# Patient Record
Sex: FEMALE | Race: WHITE | Employment: FULL TIME | URBAN - METROPOLITAN AREA
[De-identification: names, ages, dates, MRNs, and addresses within clinical notes are randomized per-mention and may not be internally consistent; named-entity substitution may affect disease eponyms.]

---

## 2017-01-02 ENCOUNTER — HOSPITAL ENCOUNTER (EMERGENCY)
Age: 42
Discharge: HOME | End: 2017-01-02
Attending: EMERGENCY MEDICINE
Payer: COMMERCIAL

## 2017-01-02 VITALS
HEART RATE: 100 BPM | BODY MASS INDEX: 33.66 KG/M2 | DIASTOLIC BLOOD PRESSURE: 75 MMHG | OXYGEN SATURATION: 100 % | TEMPERATURE: 97.7 F | WEIGHT: 190 LBS | SYSTOLIC BLOOD PRESSURE: 152 MMHG | RESPIRATION RATE: 16 BRPM | HEIGHT: 63 IN

## 2017-01-02 DIAGNOSIS — N30.01 ACUTE CYSTITIS WITH HEMATURIA: Primary | ICD-10-CM

## 2017-01-02 LAB
APPEARANCE UR: ABNORMAL
BACTERIA URNS QL MICRO: ABNORMAL /HPF
BILIRUB UR QL: NEGATIVE
COLOR UR: YELLOW
EPITH CASTS URNS QL MICRO: ABNORMAL /LPF (ref 0–5)
GLUCOSE UR STRIP.AUTO-MCNC: NEGATIVE MG/DL
HCG UR QL: NEGATIVE
HGB UR QL STRIP: ABNORMAL
KETONES UR QL STRIP.AUTO: NEGATIVE MG/DL
LEUKOCYTE ESTERASE UR QL STRIP.AUTO: ABNORMAL
NITRITE UR QL STRIP.AUTO: NEGATIVE
PH UR STRIP: 5 [PH] (ref 5–8)
PROT UR STRIP-MCNC: ABNORMAL MG/DL
RBC #/AREA URNS HPF: ABNORMAL /HPF (ref 0–5)
SP GR UR REFRACTOMETRY: 1.02 (ref 1–1.03)
UROBILINOGEN UR QL STRIP.AUTO: 0.2 EU/DL (ref 0.2–1)
WBC URNS QL MICRO: ABNORMAL /HPF (ref 0–5)

## 2017-01-02 PROCEDURE — 74011250636 HC RX REV CODE- 250/636: Performed by: PHYSICIAN ASSISTANT

## 2017-01-02 PROCEDURE — 81001 URINALYSIS AUTO W/SCOPE: CPT | Performed by: PHYSICIAN ASSISTANT

## 2017-01-02 PROCEDURE — 74011250637 HC RX REV CODE- 250/637: Performed by: PHYSICIAN ASSISTANT

## 2017-01-02 PROCEDURE — 87086 URINE CULTURE/COLONY COUNT: CPT | Performed by: PHYSICIAN ASSISTANT

## 2017-01-02 PROCEDURE — 99284 EMERGENCY DEPT VISIT MOD MDM: CPT

## 2017-01-02 PROCEDURE — 74011000250 HC RX REV CODE- 250: Performed by: PHYSICIAN ASSISTANT

## 2017-01-02 PROCEDURE — 81025 URINE PREGNANCY TEST: CPT | Performed by: EMERGENCY MEDICINE

## 2017-01-02 PROCEDURE — 87186 SC STD MICRODIL/AGAR DIL: CPT | Performed by: PHYSICIAN ASSISTANT

## 2017-01-02 PROCEDURE — 87077 CULTURE AEROBIC IDENTIFY: CPT | Performed by: PHYSICIAN ASSISTANT

## 2017-01-02 PROCEDURE — 96372 THER/PROPH/DIAG INJ SC/IM: CPT

## 2017-01-02 RX ORDER — PHENAZOPYRIDINE HYDROCHLORIDE 100 MG/1
200 TABLET, FILM COATED ORAL ONCE
Status: COMPLETED | OUTPATIENT
Start: 2017-01-02 | End: 2017-01-02

## 2017-01-02 RX ORDER — PHENAZOPYRIDINE HYDROCHLORIDE 200 MG/1
200 TABLET, FILM COATED ORAL 3 TIMES DAILY
Qty: 6 TAB | Refills: 0 | Status: SHIPPED | OUTPATIENT
Start: 2017-01-02 | End: 2017-01-04

## 2017-01-02 RX ORDER — CEPHALEXIN 500 MG/1
500 CAPSULE ORAL 3 TIMES DAILY
Qty: 21 CAP | Refills: 0 | Status: SHIPPED | OUTPATIENT
Start: 2017-01-02 | End: 2017-01-09

## 2017-01-02 RX ORDER — CEPHALEXIN 250 MG/1
500 CAPSULE ORAL
Status: COMPLETED | OUTPATIENT
Start: 2017-01-02 | End: 2017-01-02

## 2017-01-02 RX ADMIN — PHENAZOPYRIDINE HYDROCHLORIDE 200 MG: 100 TABLET ORAL at 19:46

## 2017-01-02 RX ADMIN — CEPHALEXIN 500 MG: 250 CAPSULE ORAL at 19:46

## 2017-01-02 RX ADMIN — LIDOCAINE HYDROCHLORIDE 1 G: 10 INJECTION, SOLUTION INFILTRATION; PERINEURAL at 19:47

## 2017-01-03 NOTE — ED NOTES
amb into ed - reports onset urinary burning/frequency onset yesterday - pt w/ hx uti's - last one treated here 1 month ago. Denies any fever / chlls - pt reports sexual activity normally exacerbates her uti's.

## 2017-01-03 NOTE — ED PROVIDER NOTES
HPI Comments:   7:23 PM  35 y.o. female presents to ED c/o dysuria onset yesterday. Associated symptoms include urinary frequency, urinary urgency, hematuria, and slight abdominal pain. Reports she had similar sxs last month and this dysuria feels similar. Those sxs resolved but returned one day ago. LNMP was 2 weeks ago. Pt denies fever, chills, vaginal discharge, vaginal bleeding, and any other Sx or complaints. Patient is a 35 y.o. female presenting with urinary pain. The history is provided by the patient. No  was used. Urinary Pain    This is a new problem. The current episode started yesterday. The problem has not changed since onset. The pain is at a severity of 6/10. There has been no fever. Associated symptoms include frequency, hematuria, urgency, abdominal pain (mild) and back pain. Pertinent negatives include no chills, no nausea, no vomiting, no flank pain and no vaginal discharge. Past Medical History:   Diagnosis Date    Bipolar affective ([facility] [address]     Chronic obstructive pulmonary disease (HCC)     Depression     High cholesterol     Hypothyroidism     IBS (irritable bowel syndrome)     Migraine     Other ill-defined conditions(799.89)      frequent UTI's    UTI (urinary tract infection)        Past Surgical History:   Procedure Laterality Date    Hx heent       tonsillectomy    Hx cholecystectomy      Hx gyn       tubal ligation         History reviewed. No pertinent family history. Social History     Social History    Marital status:      Spouse name: N/A    Number of children: N/A    Years of education: N/A     Occupational History    Not on file.      Social History Main Topics    Smoking status: Current Every Day Smoker     Packs/day: 0.50    Smokeless tobacco: Not on file    Alcohol use Yes      Comment: rarely    Drug use: No    Sexual activity: Yes     Birth control/ protection: Surgical     Other Topics Concern    Not on file Social History Narrative         ALLERGIES: Sulfa (sulfonamide antibiotics) and Imitrex [sumatriptan succinate]    Review of Systems   Constitutional: Negative for chills and fever. HENT: Negative for sore throat. Cardiovascular: Negative for chest pain. Gastrointestinal: Positive for abdominal pain (mild). Negative for nausea and vomiting. Genitourinary: Positive for dysuria, frequency, hematuria, pelvic pain and urgency. Negative for difficulty urinating, flank pain, vaginal bleeding and vaginal discharge. Musculoskeletal: Positive for back pain. Negative for myalgias. Skin: Negative for rash and wound. Allergic/Immunologic: Negative for immunocompromised state. Neurological: Negative for dizziness and light-headedness. Hematological: Negative for adenopathy. Vitals:    01/02/17 1901   BP: 152/75   Pulse: 100   Resp: 16   Temp: 97.7 °F (36.5 °C)   SpO2: 100%   Weight: 86.2 kg (190 lb)   Height: 5' 3\" (1.6 m)            Physical Exam   Constitutional: She is oriented to person, place, and time. She appears well-developed and well-nourished. No distress.  female in NAD. Alert. Appears comfortable. In hallway bed J   HENT:   Head: Normocephalic and atraumatic. Right Ear: External ear normal.   Eyes: Conjunctivae are normal.   Neck: Normal range of motion. Cardiovascular: Normal rate, regular rhythm, normal heart sounds and intact distal pulses. Exam reveals no gallop and no friction rub. No murmur heard. Pulmonary/Chest: Effort normal and breath sounds normal. No accessory muscle usage. No tachypnea. No respiratory distress. She has no decreased breath sounds. She has no wheezes. She has no rhonchi. She has no rales. Abdominal: Soft. Bowel sounds are normal. She exhibits no distension, no ascites and no mass. There is no tenderness. There is no rigidity, no guarding, no CVA tenderness and no tenderness at McBurney's point. Musculoskeletal: Normal range of motion. Neurological: She is alert and oriented to person, place, and time. Skin: Skin is warm and dry. No rash noted. She is not diaphoretic. No erythema. Psychiatric: She has a normal mood and affect. Judgment normal.   Nursing note and vitals reviewed. RESULTS:    No orders to display        Labs Reviewed   URINALYSIS W/ RFLX MICROSCOPIC - Abnormal; Notable for the following:        Result Value    Protein TRACE (*)     Blood LARGE (*)     Leukocyte Esterase MODERATE (*)     All other components within normal limits   URINE MICROSCOPIC ONLY - Abnormal; Notable for the following:     Bacteria 2+ (*)     All other components within normal limits   CULTURE, URINE   HCG URINE, QL       Recent Results (from the past 12 hour(s))   URINALYSIS W/ RFLX MICROSCOPIC    Collection Time: 01/02/17  7:00 PM   Result Value Ref Range    Color YELLOW      Appearance CLOUDY      Specific gravity 1.020 1.005 - 1.030      pH (UA) 5.0 5.0 - 8.0      Protein TRACE (A) NEG mg/dL    Glucose NEGATIVE  NEG mg/dL    Ketone NEGATIVE  NEG mg/dL    Bilirubin NEGATIVE  NEG      Blood LARGE (A) NEG      Urobilinogen 0.2 0.2 - 1.0 EU/dL    Nitrites NEGATIVE  NEG      Leukocyte Esterase MODERATE (A) NEG     HCG URINE, QL    Collection Time: 01/02/17  7:00 PM   Result Value Ref Range    HCG urine, Ql. NEGATIVE  NEG     URINE MICROSCOPIC ONLY    Collection Time: 01/02/17  7:00 PM   Result Value Ref Range    WBC 15 to 20 0 - 5 /hpf    RBC 10 to 15 0 - 5 /hpf    Epithelial cells FEW 0 - 5 /lpf    Bacteria 2+ (A) NEG /hpf       MDM  Number of Diagnoses or Management Options  Acute cystitis with hematuria:   Diagnosis management comments: UTI/pyelo, stone, stricture, vaginitis, STI/PID, pregnancy.  Doubt appy, torsion, or TOA    ED Course     Medications   cefTRIAXone (ROCEPHIN) 1 g in lidocaine (XYLOCAINE) 10 mg/mL (1 %) IM syringe (not administered)   cephALEXin (KEFLEX) capsule 500 mg (not administered)   phenazopyridine (PYRIDIUM) tablet 200 mg (not administered)        Procedures    PROGRESS NOTE:  7:23 PM  Initial assessment performed. Written by Nini Covington ED Scribe, as dictated by Jorge Leahy PA-C     UA c/w UTI. No evidence of upper urinary tract involvement. Benign abdomen/pelvis. Reasons to RTED discussed with pt. All questions answered. Pt feels comfortable going home at this time. Pt expressed understanding and she agrees with plan. DISCHARGE NOTE:  7:29 PM  Melinda Krause's  results have been reviewed with her. She has been counseled regarding her diagnosis, treatment, and plan. She verbally conveys understanding and agreement of the signs, symptoms, diagnosis, treatment and prognosis and additionally agrees to follow up as discussed. She also agrees with the care-plan and conveys that all of her questions have been answered. I have also provided discharge instructions for her that include: educational information regarding their diagnosis and treatment, and list of reasons why they would want to return to the ED prior to their follow-up appointment, should her condition change. Proper ED utilization discussed with the pt. CLINICAL IMPRESSION:    1. Acute cystitis with hematuria        PLAN: DISCHARGE HOME    Follow-up Information     Follow up With Details Comments [id number] Kamini Pimentel MD Call in 2 days For follow up with PCP. [address] [place] [address]      [facility] EMERGENCY DEPT Go to As needed, If symptoms worsen. [address]  [phone]          Current Discharge Medication List      START taking these medications    Details   cephALEXin (KEFLEX) 500 mg capsule Take 1 Cap by mouth three (3) times daily for 7 days. Indications: E. COLI URINARY TRACT INFECTION  Qty: 21 Cap, Refills: 0      phenazopyridine (PYRIDIUM) 200 mg tablet Take 1 Tab by mouth three (3) times daily for 2 days.  Indications: DYSURIA  Qty: 6 Tab, Refills: 0 CONTINUE these medications which have NOT CHANGED    Details   BUTALB/ACETAMINOPHEN/CAFFEINE (FIORICET PO) Take  by mouth.      polyethylene glycol (MIRALAX) 17 gram/dose powder Take as directed. Qty: 119 g, Refills: 3      HYDROcodone-acetaminophen (NORCO) 5-325 mg per tablet Take 1 Tab by mouth every four (4) hours as needed for Pain. Qty: 20 Tab, Refills: 0      fluticasone (FLONASE) 50 mcg/actuation nasal spray 1 [place] by Both Nostrils route daily. 1 spray in each nostril. Qty: 1 Bottle, Refills: 0      sertraline (ZOLOFT) 100 mg tablet Take  by mouth daily. levothyroxine (SYNTHROID) 150 mcg tablet Take  by mouth Daily (before breakfast). esomeprazole (NEXIUM) 40 mg capsule Take  by mouth daily. ziprasidone (GEODON) 40 mg capsule Take 60 mg by mouth daily. zolpidem (AMBIEN) 10 mg tablet Take  by mouth nightly as needed. ALPRAZolam (XANAX) 1 mg tablet Take  by mouth. ATTESTATIONS:  This note is prepared by Letha Solano, acting as Scribe for Faizan Mcneill PA-C. Faizan Mcneill PA-C: The scribe's documentation has been prepared under my direction and personally reviewed by me in its entirety. I confirm that the note above accurately reflects all work, treatment, procedures, and medical decision making performed by me.

## 2017-01-03 NOTE — ED NOTES
Patient armband removed and shredded  I have reviewed discharge instructions with the patient. The patient verbalized understanding.

## 2017-01-03 NOTE — DISCHARGE INSTRUCTIONS
Urinary Tract Infection in Women: Care Instructions  Your Care Instructions    A urinary tract infection, or UTI, is a general term for an infection anywhere between the kidneys and the urethra (where urine comes out). Most UTIs are bladder infections. They often cause pain or burning when you urinate. UTIs are caused by bacteria and can be cured with antibiotics. Be sure to complete your treatment so that the infection goes away. Follow-up care is a key part of your treatment and safety. Be sure to make and go to all appointments, and call your doctor if you are having problems. It's also a good idea to know your test results and keep a list of the medicines you take. How can you care for yourself at home? · Take your antibiotics as directed. Do not stop taking them just because you feel better. You need to take the full course of antibiotics. · Drink extra water and other fluids for the next day or two. This may help wash out the bacteria that are causing the infection. (If you have kidney, heart, or liver disease and have to limit fluids, talk with your doctor before you increase your fluid intake.)  · Avoid drinks that are carbonated or have caffeine. They can irritate the bladder. · Urinate often. Try to empty your bladder each time. · To relieve pain, take a hot bath or lay a heating pad set on low over your lower belly or genital area. Never go to sleep with a heating pad in place. To prevent UTIs  · Drink plenty of water each day. This helps you urinate often, which clears bacteria from your system. (If you have kidney, heart, or liver disease and have to limit fluids, talk with your doctor before you increase your fluid intake.)  · Consider adding cranberry juice to your diet. · Urinate when you need to. · Urinate right after you have sex. · Change sanitary pads often. · Avoid douches, bubble baths, feminine hygiene sprays, and other feminine hygiene products that have deodorants.   · After going to the bathroom, wipe from front to back. When should you call for help? Call your doctor now or seek immediate medical care if:  · Symptoms such as fever, chills, nausea, or vomiting get worse or appear for the first time. · You have new pain in your back just below your rib cage. This is called flank pain. · There is new blood or pus in your urine. · You have any problems with your antibiotic medicine. Watch closely for changes in your health, and be sure to contact your doctor if:  · You are not getting better after taking an antibiotic for 2 days. · Your symptoms go away but then come back. Where can you learn more? Go to http://aury-cruz.info/. Enter I099 in the search box to learn more about \"Urinary Tract Infection in Women: Care Instructions. \"  Current as of: August 12, 2016  Content Version: 11.1  © 1801-4455 [employer]. Care instructions adapted under license by [employer] (which disclaims liability or warranty for this information). If you have questions about a medical condition or this instruction, always ask your healthcare professional. [address] any warranty or liability for your use of this information.

## 2017-01-04 NOTE — CALL BACK NOTE
9:16 AM  01/04/2017    Reviewed recent [facility] ED visit. Dx with UTI. On keflex. Will await sensitivity.      Mishel Raines PA-C

## 2017-01-05 LAB
BACTERIA SPEC CULT: NORMAL
SERVICE CMNT-IMP: NORMAL

## 2017-01-05 NOTE — CALL BACK NOTE
8:16 AM  01/05/2017    See previous call back note. Sensitive to keflex. On appropriate tx.      Patrick Croft PA-C

## 2017-02-01 ENCOUNTER — HOSPITAL ENCOUNTER (EMERGENCY)
Age: 42
Discharge: HOME | End: 2017-02-01
Attending: EMERGENCY MEDICINE
Payer: COMMERCIAL

## 2017-02-01 VITALS
SYSTOLIC BLOOD PRESSURE: 124 MMHG | DIASTOLIC BLOOD PRESSURE: 58 MMHG | BODY MASS INDEX: 33.66 KG/M2 | HEART RATE: 78 BPM | HEIGHT: 63 IN | WEIGHT: 190 LBS | OXYGEN SATURATION: 100 % | TEMPERATURE: 97.9 F | RESPIRATION RATE: 18 BRPM

## 2017-02-01 DIAGNOSIS — E86.0 MILD DEHYDRATION: ICD-10-CM

## 2017-02-01 DIAGNOSIS — N30.01 ACUTE CYSTITIS WITH HEMATURIA: ICD-10-CM

## 2017-02-01 DIAGNOSIS — R11.2 NAUSEA, VOMITING AND DIARRHEA: Primary | ICD-10-CM

## 2017-02-01 DIAGNOSIS — R19.7 NAUSEA, VOMITING AND DIARRHEA: Primary | ICD-10-CM

## 2017-02-01 LAB
APPEARANCE UR: ABNORMAL
BACTERIA URNS QL MICRO: ABNORMAL /HPF
BILIRUB UR QL: NEGATIVE
CAOX CRY URNS QL MICRO: ABNORMAL
COLOR UR: ABNORMAL
EPITH CASTS URNS QL MICRO: ABNORMAL /LPF (ref 0–5)
GLUCOSE UR STRIP.AUTO-MCNC: NEGATIVE MG/DL
HGB UR QL STRIP: ABNORMAL
KETONES UR QL STRIP.AUTO: ABNORMAL MG/DL
LEUKOCYTE ESTERASE UR QL STRIP.AUTO: ABNORMAL
MUCOUS THREADS URNS QL MICRO: ABNORMAL /LPF
NITRITE UR QL STRIP.AUTO: NEGATIVE
PH UR STRIP: 5.5 [PH] (ref 5–8)
PROT UR STRIP-MCNC: ABNORMAL MG/DL
RBC #/AREA URNS HPF: ABNORMAL /HPF (ref 0–5)
SP GR UR REFRACTOMETRY: >1.03 (ref 1–1.03)
UROBILINOGEN UR QL STRIP.AUTO: 1 EU/DL (ref 0.2–1)
WBC URNS QL MICRO: ABNORMAL /HPF (ref 0–5)
YEAST URNS QL MICRO: ABNORMAL

## 2017-02-01 PROCEDURE — 99283 EMERGENCY DEPT VISIT LOW MDM: CPT

## 2017-02-01 PROCEDURE — 81001 URINALYSIS AUTO W/SCOPE: CPT | Performed by: PHYSICIAN ASSISTANT

## 2017-02-01 PROCEDURE — 74011250637 HC RX REV CODE- 250/637: Performed by: PHYSICIAN ASSISTANT

## 2017-02-01 RX ORDER — DICYCLOMINE HYDROCHLORIDE 10 MG/1
10 CAPSULE ORAL
Status: COMPLETED | OUTPATIENT
Start: 2017-02-01 | End: 2017-02-01

## 2017-02-01 RX ORDER — ONDANSETRON 4 MG/1
4 TABLET, ORALLY DISINTEGRATING ORAL
Qty: 12 TAB | Refills: 0 | Status: SHIPPED | OUTPATIENT
Start: 2017-02-01 | End: 2017-03-19

## 2017-02-01 RX ORDER — ONDANSETRON 4 MG/1
4 TABLET, ORALLY DISINTEGRATING ORAL
Status: COMPLETED | OUTPATIENT
Start: 2017-02-01 | End: 2017-02-01

## 2017-02-01 RX ORDER — NITROFURANTOIN 25; 75 MG/1; MG/1
100 CAPSULE ORAL 2 TIMES DAILY
Qty: 14 CAP | Refills: 0 | Status: SHIPPED | OUTPATIENT
Start: 2017-02-01 | End: 2017-02-08

## 2017-02-01 RX ADMIN — ONDANSETRON 4 MG: 4 TABLET, ORALLY DISINTEGRATING ORAL at 20:01

## 2017-02-01 RX ADMIN — DICYCLOMINE HYDROCHLORIDE 10 MG: 10 CAPSULE ORAL at 20:02

## 2017-02-01 NOTE — LETTER
[facility] 
[facility] EMERGENCY DEPT 
[address] [address] 
[phone] Work/School Note Date: 2/1/2017 To Whom It May concern: 
 
Delmy Diaz was seen and treated today in the emergency room by the following provider(s): 
Attending Provider: Dmitri Marte DO Physician Assistant: KASSY Borjas.   
 
Delmy Diaz may return to work on Thursday, 2/2/2017.  
 
Sincerely, 
 
 
 
 
Shyla Davila PA-C

## 2017-02-02 NOTE — ED TRIAGE NOTES
amb into ed w/ c/o's n/v/d onset yesterday - feels weak today and dizzy. Reports tntc episodes of diarrhea and 5 episodes of n/v since yesterday. abd w/ sharp pains shooting through. + sick contact in family w/ same illness.

## 2017-02-02 NOTE — DISCHARGE INSTRUCTIONS
Dehydration: Care Instructions  Your Care Instructions  Dehydration happens when your body loses too much fluid. This might happen when you do not drink enough water or you lose large amounts of fluids from your body because of diarrhea, vomiting, or sweating. Severe dehydration can be life-threatening. Water and minerals called electrolytes help put your body fluids back in balance. Learn the early signs of fluid loss, and drink more fluids to prevent dehydration. Follow-up care is a key part of your treatment and safety. Be sure to make and go to all appointments, and call your doctor if you are having problems. It's also a good idea to know your test results and keep a list of the medicines you take. How can you care for yourself at home? · To prevent dehydration, drink plenty of fluids, enough so that your urine is light yellow or clear like water. Choose water and other caffeine-free clear liquids until you feel better. If you have kidney, heart, or liver disease and have to limit fluids, talk with your doctor before you increase the amount of fluids you drink. · If you do not feel like eating or drinking, try taking small sips of water, sports drinks, or other rehydration drinks. · Get plenty of rest.  To prevent dehydration  · Add more fluids to your diet and daily routine, unless your doctor has told you not to. · During hot weather, drink more fluids. Drink even more fluids if you exercise a lot. Stay away from drinks with alcohol or caffeine. · Watch for the symptoms of dehydration. These include:  ¨ A dry, sticky mouth. ¨ Dark yellow urine, and not much of it. ¨ Dry and sunken eyes. ¨ Feeling very tired. · Learn what problems can lead to dehydration. These include:  ¨ Diarrhea, fever, and vomiting. ¨ Any illness with a fever, such as pneumonia or the flu. ¨ Activities that cause heavy sweating, such as endurance races and heavy outdoor work in hot or humid weather.   ¨ Alcohol or drug abuse or withdrawal.  ¨ Certain medicines, such as cold and allergy pills (antihistamines), diet pills (diuretics), and laxatives. ¨ Certain diseases, such as diabetes, cancer, and heart or kidney disease. When should you call for help? Call 911 anytime you think you may need emergency care. For example, call if:  · You passed out (lost consciousness). Call your doctor now or seek immediate medical care if:  · You are confused and cannot think clearly. · You are dizzy or lightheaded, or you feel like you may faint. · You have signs of needing more fluids. You have sunken eyes and a dry mouth, and you pass only a little dark urine. · You cannot keep fluids down. Watch closely for changes in your health, and be sure to contact your doctor if:  · You are not making tears. · Your skin is very dry and sags slowly back into place after you pinch it. · Your mouth and eyes are very dry. Where can you learn more? Go to http://aury-cruz.info/. Enter L538 in the search box to learn more about \"Dehydration: Care Instructions. \"  Current as of: May 27, 2016  Content Version: 11.1  © 9165-5483 [employer]. Care instructions adapted under license by [employer] (which disclaims liability or warranty for this information). If you have questions about a medical condition or this instruction, always ask your healthcare professional. [place] [address] any warranty or liability for your use of this information. Nausea and Vomiting: Care Instructions  Your Care Instructions    When you are nauseated, you may feel weak and sweaty and notice a lot of saliva in your mouth. Nausea often leads to vomiting. Most of the time you do not need to worry about nausea and vomiting, but they can be signs of other illnesses. Two common causes of nausea and vomiting are stomach flu and food poisoning.  Nausea and vomiting from viral stomach flu will usually start to improve within 24 hours. Nausea and vomiting from food poisoning may last from 12 to 48 hours. The doctor has checked you carefully, but problems can develop later. If you notice any problems or new symptoms, get medical treatment right away. Follow-up care is a key part of your treatment and safety. Be sure to make and go to all appointments, and call your doctor if you are having problems. It's also a good idea to know your test results and keep a list of the medicines you take. How can you care for yourself at home? · To prevent dehydration, drink plenty of fluids, enough so that your urine is light yellow or clear like water. Choose water and other caffeine-free clear liquids until you feel better. If you have kidney, heart, or liver disease and have to limit fluids, talk with your doctor before you increase the amount of fluids you drink. · Rest in bed until you feel better. · When you are able to eat, try clear soups, mild foods, and liquids until all symptoms are gone for 12 to 48 hours. Other good choices include dry toast, crackers, cooked cereal, and gelatin dessert, such as Jell-O. When should you call for help? Call 911 anytime you think you may need emergency care. For example, call if:  · You passed out (lost consciousness). Call your doctor now or seek immediate medical care if:  · You have symptoms of dehydration, such as:  ¨ Dry eyes and a dry mouth. ¨ Passing only a little dark urine. ¨ Feeling thirstier than usual.  · You have new or worsening belly pain. · You have a new or higher fever. · You vomit blood or what looks like coffee grounds. Watch closely for changes in your health, and be sure to contact your doctor if:  · You have ongoing nausea and vomiting. · Your vomiting is getting worse. · Your vomiting lasts longer than 2 days. · You are not getting better as expected. Where can you learn more? Go to http://aury-cruz.info/.   Enter 65 [id number] in the search box to learn more about \"Nausea and Vomiting: Care Instructions. \"  Current as of: May 27, 2016  Content Version: 11.1  © 0169-4378 [employer]. Care instructions adapted under license by [employer] (which disclaims liability or warranty for this information). If you have questions about a medical condition or this instruction, always ask your healthcare professional. [address] any warranty or liability for your use of this information. Diarrhea: Care Instructions  Your Care Instructions    Diarrhea is loose, watery stools (bowel movements). The exact cause is often hard to find. Sometimes diarrhea is your body's way of getting rid of what caused an upset stomach. Viruses, food poisoning, and many medicines can cause diarrhea. Some people get diarrhea in response to emotional stress, anxiety, or certain foods. Almost everyone has diarrhea now and then. It usually isn't serious, and your stools will return to normal soon. The important thing to do is replace the fluids you have lost, so you can prevent dehydration. The doctor has checked you carefully, but problems can develop later. If you notice any problems or new symptoms, get medical treatment right away. Follow-up care is a key part of your treatment and safety. Be sure to make and go to all appointments, and call your doctor if you are having problems. It's also a good idea to know your test results and keep a list of the medicines you take. How can you care for yourself at home? · Watch for signs of dehydration, which means your body has lost too much water. Dehydration is a serious condition and should be treated right away. Signs of dehydration are:  ¨ Increasing thirst and dry eyes and mouth. ¨ Feeling faint or lightheaded. ¨ Darker urine, and a smaller amount of urine than normal.  · To prevent dehydration, drink plenty of fluids, enough so that your urine is light yellow or clear like water. Choose water and other caffeine-free clear liquids until you feel better. If you have kidney, heart, or liver disease and have to limit fluids, talk with your doctor before you increase the amount of fluids you drink. · Begin eating small amounts of mild foods the next day, if you feel like it. ¨ Try yogurt that has live cultures of Lactobacillus. (Check the label.)  ¨ Avoid spicy foods, fruits, alcohol, and caffeine until 48 hours after all symptoms are gone. ¨ Avoid chewing gum that contains sorbitol. ¨ Avoid dairy products (except for yogurt with Lactobacillus) while you have diarrhea and for 3 days after symptoms are gone. · The doctor may recommend that you take over-the-counter medicine, such as loperamide (Imodium), if you still have diarrhea after 6 hours. Read and follow all instructions on the label. Do not use this medicine if you have bloody diarrhea, a high fever, or other signs of serious illness. Call your doctor if you think you are having a problem with your medicine. When should you call for help? Call 911 anytime you think you may need emergency care. For example, call if:  · You passed out (lost consciousness). · Your stools are maroon or very bloody. Call your doctor now or seek immediate medical care if:  · You are dizzy or lightheaded, or you feel like you may faint. · Your stools are black and look like tar, or they have streaks of blood. · You have new or worse belly pain. · You have symptoms of dehydration, such as:  ¨ Dry eyes and a dry mouth. ¨ Passing only a little dark urine. ¨ Feeling thirstier than usual.  · You have a new or higher fever. Watch closely for changes in your health, and be sure to contact your doctor if:  · Your diarrhea is getting worse. · You see pus in the diarrhea. · You are not getting better after 2 days (48 hours). Where can you learn more? Go to http://aury-cruz.info/.   Enter S123 in the search box to learn more about \"Diarrhea: Care Instructions. \"  Current as of: May 27, 2016  Content Version: 11.1  © 7493-4952 [employer]. Care instructions adapted under license by [employer] (which disclaims liability or warranty for this information). If you have questions about a medical condition or this instruction, always ask your healthcare professional. [address] any warranty or liability for your use of this information. Urinary Tract Infection in Women: Care Instructions  Your Care Instructions    A urinary tract infection, or UTI, is a general term for an infection anywhere between the kidneys and the urethra (where urine comes out). Most UTIs are bladder infections. They often cause pain or burning when you urinate. UTIs are caused by bacteria and can be cured with antibiotics. Be sure to complete your treatment so that the infection goes away. Follow-up care is a key part of your treatment and safety. Be sure to make and go to all appointments, and call your doctor if you are having problems. It's also a good idea to know your test results and keep a list of the medicines you take. How can you care for yourself at home? · Take your antibiotics as directed. Do not stop taking them just because you feel better. You need to take the full course of antibiotics. · Drink extra water and other fluids for the next day or two. This may help wash out the bacteria that are causing the infection. (If you have kidney, heart, or liver disease and have to limit fluids, talk with your doctor before you increase your fluid intake.)  · Avoid drinks that are carbonated or have caffeine. They can irritate the bladder. · Urinate often. Try to empty your bladder each time. · To relieve pain, take a hot bath or lay a heating pad set on low over your lower belly or genital area. Never go to sleep with a heating pad in place. To prevent UTIs  · Drink plenty of water each day.  This helps you urinate often, which clears bacteria from your system. (If you have kidney, heart, or liver disease and have to limit fluids, talk with your doctor before you increase your fluid intake.)  · Consider adding cranberry juice to your diet. · Urinate when you need to. · Urinate right after you have sex. · Change sanitary pads often. · Avoid douches, bubble baths, feminine hygiene sprays, and other feminine hygiene products that have deodorants. · After going to the bathroom, wipe from front to back. When should you call for help? Call your doctor now or seek immediate medical care if:  · Symptoms such as fever, chills, nausea, or vomiting get worse or appear for the first time. · You have new pain in your back just below your rib cage. This is called flank pain. · There is new blood or pus in your urine. · You have any problems with your antibiotic medicine. Watch closely for changes in your health, and be sure to contact your doctor if:  · You are not getting better after taking an antibiotic for 2 days. · Your symptoms go away but then come back. Where can you learn more? Go to http://aury-cruz.info/. Enter M933 in the search box to learn more about \"Urinary Tract Infection in Women: Care Instructions. \"  Current as of: August 12, 2016  Content Version: 11.1  © 0623-0536 [employer]. Care instructions adapted under license by [employer] (which disclaims liability or warranty for this information). If you have questions about a medical condition or this instruction, always ask your healthcare professional. [place] [address] any warranty or liability for your use of this information.

## 2017-02-02 NOTE — ED PROVIDER NOTES
[address]  EMERGENCY DEPARTMENT HISTORY AND PHYSICAL EXAM       Date: 2/1/2017   Patient Name: Mariella Marie   YOB: 1975  Medical Record Number: [id number]    History of Presenting Illness     Chief Complaint   Patient presents with    Vomiting        History Provided By:  patient    Additional History:   7:47 PM  Mariella Marie is a 35 y.o. female with a h/o IBS, COPD, frequent UTIs, and HLD who presents to the emergency department C/O vomiting x 5 onset yesterday (4:00 AM). Associated sx include nausea, diarrhea x 10, intermittent periumbilical pain, subjective fever, and body aches. Pt reports she has been able to keep some fluids (water) down today. Has had contact with family members with same sx. Also reports left ear pain. SHx cholecystectomy and tubal ligation. Pt is a tobacco user. Denies EtOH use and illicit drug use. Denies urinary sx. Primary Care Provider: Radha Canales MD   Specialist:    Past History     Past Medical History:   Past Medical History   Diagnosis Date    Bipolar affective ([address]     Chronic obstructive pulmonary disease ([address]     Depression     High cholesterol     Hypothyroidism     IBS (irritable bowel syndrome)     Migraine     Other ill-defined conditions(799.89)      frequent UTI's    UTI (urinary tract infection)         Past Surgical History:   Past Surgical History   Procedure Laterality Date    Hx heent       tonsillectomy    Hx cholecystectomy      Hx gyn       tubal ligation        Family History:   History reviewed. No pertinent family history. Social History:   Social History   Substance Use Topics    Smoking status: Current Every Day Smoker     Packs/day: 0.50    Smokeless tobacco: None    Alcohol use Yes      Comment: rarely        Allergies:    Allergies   Allergen Reactions    Sulfa (Sulfonamide Antibiotics) Hives    Imitrex [Sumatriptan Succinate] Other (comments)     \"makes headaches worse\" Review of Systems   Review of Systems   Constitutional: Positive for fever (subjective). HENT: Positive for ear pain (left ear ). Gastrointestinal: Positive for abdominal pain (periumbilical), diarrhea, nausea and vomiting. Genitourinary: Negative for decreased urine volume, difficulty urinating, dysuria, frequency, hematuria and urgency. Musculoskeletal: Positive for myalgias (body aches). All other systems reviewed and are negative. Physical Exam  Vitals:    02/01/17 1940   BP: 124/58   Pulse: 78   Resp: 18   Temp: 97.9 °F (36.6 °C)   SpO2: 100%   Weight: 86.2 kg (190 lb)   Height: 5' 3\" (1.6 m)       Physical Exam   Nursing note and vitals reviewed. Vital signs and nursing notes reviewed. CONSTITUTIONAL: Alert. Well-appearing; well-nourished; in no apparent distress. HEAD: Normocephalic; atraumatic. EYES: PERRL; Conjunctiva clear. ENT: TM's normal. External ear normal. Normal nose; no rhinorrhea. Normal pharynx. Tonsils not enlarged without exudate. Moist mucus membranes. NECK: Supple; FROM without difficulty, non-tender; no cervical lymphadenopathy. CV: Normal S1, S2; no murmurs, rubs, or gallops. No chest wall tenderness. RESPIRATORY: Normal chest excursion with respiration; breath sounds clear and equal bilaterally; no wheezes, rhonchi, or rales. GI: Normal bowel sounds; non-distended; Mild epigastric tenderness; no lower abd tenderness; no guarding or rigidity; no palpable organomegaly. No CVA tenderness. BACK:  No evidence of trauma or deformity. Non-tender to palpation. FROM without difficulty. EXT: Normal ROM in all four extremities; non-tender to palpation. SKIN: Normal for age and race; warm; dry; good turgor; no apparent lesions or exudate. NEURO: A & O x3. PSYCH:  Mood and affect appropriate.        Diagnostic Study Results     Labs -      Recent Results (from the past 12 hour(s))   URINALYSIS W/ RFLX MICROSCOPIC    Collection Time: 02/01/17  8:03 PM Result Value Ref Range    Color DARK YELLOW      Appearance CLOUDY      Specific gravity >1.030 (H) 1.005 - 1.030    pH (UA) 5.5 5.0 - 8.0      Protein TRACE (A) NEG mg/dL    Glucose NEGATIVE  NEG mg/dL    Ketone TRACE (A) NEG mg/dL    Bilirubin NEGATIVE  NEG      Blood MODERATE (A) NEG      Urobilinogen 1.0 0.2 - 1.0 EU/dL    Nitrites NEGATIVE  NEG      Leukocyte Esterase TRACE (A) NEG         Radiologic Studies -  No orders to display       Medical Decision Making   I am the first provider for this patient. I reviewed the vital signs, available nursing notes, past medical history, past surgical history, family history and social history. Vital Signs-Reviewed the patient's vital signs. Patient Vitals for the past 12 hrs:   Temp Pulse Resp BP SpO2   02/01/17 1940 97.9 °F (36.6 °C) 78 18 124/58 100 %         Pulse Oximetry Analysis - Normal 100% on room air. Old Medical Records: Nursing notes. ED Course:      Medications Given in the ED:  Medications   ondansetron (ZOFRAN ODT) tablet 4 mg (4 mg Oral Given 2/1/17 2001)   dicyclomine (BENTYL) capsule 10 mg (10 mg Oral Given 2/1/17 2002)        PROGRESS NOTE:  7:47 PM   Initial assessment performed. Written by Bryan Fatima ED scribe, as dictated by Sumit Ravi PA-C. Discharge Note:  8:16 PM  Pt has been reexamined. Patient has no new complaints, changes, or physical findings. Care plan outlined and precautions discussed. Results were reviewed with the patient. All medications were reviewed with the patient; will d/c home with Zofran and Macrobid. All of pt's questions and concerns were addressed. Patient was instructed and agrees to follow up with PCP, as well as to return to the ED upon further deterioration. Patient is ready to go home. Diagnosis   Clinical Impression:   1. Nausea, vomiting and diarrhea    2. Mild dehydration    3.  Acute cystitis with hematuria         Discussion:  35year old healthy appearing female present with N/V/D, epigastric pain, fever, and body aches onset yesterday, improving today. She does not appear clinically dehydrated, requiring IVFs and is tolerating PO fluids. Mildly tender in epigastric area. No known risk factors for pancreatitis. Two family members in house with same sx. Likely viral illness. IV and labs not necessary at this time, pt agrees. Will give Zofran ODT and bentyl and check urine due to h/o UTIs and back pain. She understands to return to ED if sx worsen, cannot tolerate PO fluids, pain localizes to RLQ, or blood present in stools. Follow-up Information     Follow up With Details Comments 1559 Kamini Pimentel MD Schedule an appointment as soon as possible for a visit in 2 days Follow up with your PCP. [address] [place] [address]      [facility] EMERGENCY [facility]  As needed, If symptoms worsen [address]  [address] [place] [address]  [phone]          Discharge Medication List as of 2/1/2017  8:15 PM      START taking these medications    Details   ondansetron (ZOFRAN ODT) 4 mg disintegrating tablet Take 1 Tab by mouth every eight (8) hours as needed for Nausea. , Print, Disp-12 Tab, R-0      nitrofurantoin, macrocrystal-monohydrate, (MACROBID) 100 mg capsule Take 1 Cap by mouth two (2) times a day for 7 days. , Print, Disp-14 Cap, R-0         CONTINUE these medications which have NOT CHANGED    Details   BUTALB/ACETAMINOPHEN/CAFFEINE (FIORICET PO) Take  by mouth., Historical Med      sertraline (ZOLOFT) 100 mg tablet Take  by mouth daily. , Historical Med      levothyroxine (SYNTHROID) 150 mcg tablet Take  by mouth Daily (before breakfast). , Historical Med      esomeprazole (NEXIUM) 40 mg capsule Take  by mouth daily. , Historical Med      ziprasidone (GEODON) 40 mg capsule Take 60 mg by mouth daily. , Historical Med      zolpidem (AMBIEN) 10 mg tablet Take  by mouth nightly as needed., Historical Med      ALPRAZolam (XANAX) 1 mg tablet Take  by mouth., Historical Med      polyethylene glycol (MIRALAX) 17 gram/dose powder Take as directed. , Print, Disp-119 g, R-3      HYDROcodone-acetaminophen (NORCO) 5-325 mg per tablet Take 1 Tab by mouth every four (4) hours as needed for Pain., Print, Disp-20 Tab, R-0      fluticasone (FLONASE) 50 mcg/actuation nasal spray 1 [place] by Both Nostrils route daily. 1 spray in each nostril., Print, Disp-1 Bottle, R-0             _______________________________   Attestations: This note is prepared by Jonathan Avina, acting as a Scribe for [employer] on 7:45 PM on 2/1/2017 . [employer]: The scribe's documentation has been prepared under my direction and personally reviewed by me in its entirety.   _______________________________

## 2017-02-02 NOTE — ED NOTES
On d/c pt requests work note for today. States that she is going out of town and will follow up with PCP on Monday.

## 2017-03-19 ENCOUNTER — HOSPITAL ENCOUNTER (EMERGENCY)
Age: 42
Discharge: HOME | End: 2017-03-19
Attending: EMERGENCY MEDICINE
Payer: COMMERCIAL

## 2017-03-19 VITALS
DIASTOLIC BLOOD PRESSURE: 83 MMHG | TEMPERATURE: 98 F | RESPIRATION RATE: 16 BRPM | OXYGEN SATURATION: 100 % | HEART RATE: 96 BPM | WEIGHT: 190 LBS | BODY MASS INDEX: 33.66 KG/M2 | SYSTOLIC BLOOD PRESSURE: 126 MMHG | HEIGHT: 63 IN

## 2017-03-19 DIAGNOSIS — N39.0 UTI (URINARY TRACT INFECTION), UNCOMPLICATED: Primary | ICD-10-CM

## 2017-03-19 LAB
APPEARANCE UR: ABNORMAL
BACTERIA URNS QL MICRO: ABNORMAL /HPF
BILIRUB UR QL: NEGATIVE
COLOR UR: YELLOW
EPITH CASTS URNS QL MICRO: ABNORMAL /LPF (ref 0–5)
GLUCOSE UR STRIP.AUTO-MCNC: NEGATIVE MG/DL
HGB UR QL STRIP: ABNORMAL
KETONES UR QL STRIP.AUTO: NEGATIVE MG/DL
LEUKOCYTE ESTERASE UR QL STRIP.AUTO: ABNORMAL
NITRITE UR QL STRIP.AUTO: NEGATIVE
PH UR STRIP: 5.5 [PH] (ref 5–8)
PROT UR STRIP-MCNC: ABNORMAL MG/DL
RBC #/AREA URNS HPF: ABNORMAL /HPF (ref 0–5)
SP GR UR REFRACTOMETRY: 1.02 (ref 1–1.03)
UROBILINOGEN UR QL STRIP.AUTO: 0.2 EU/DL (ref 0.2–1)
WBC URNS QL MICRO: ABNORMAL /HPF (ref 0–5)

## 2017-03-19 PROCEDURE — 74011250637 HC RX REV CODE- 250/637: Performed by: PHYSICIAN ASSISTANT

## 2017-03-19 PROCEDURE — 99283 EMERGENCY DEPT VISIT LOW MDM: CPT

## 2017-03-19 PROCEDURE — 81001 URINALYSIS AUTO W/SCOPE: CPT | Performed by: EMERGENCY MEDICINE

## 2017-03-19 RX ORDER — PHENAZOPYRIDINE HYDROCHLORIDE 100 MG/1
100 TABLET, FILM COATED ORAL
Qty: 9 TAB | Refills: 0 | Status: SHIPPED | OUTPATIENT
Start: 2017-03-19 | End: 2017-03-22

## 2017-03-19 RX ORDER — IBUPROFEN 600 MG/1
600 TABLET ORAL
Status: COMPLETED | OUTPATIENT
Start: 2017-03-19 | End: 2017-03-19

## 2017-03-19 RX ORDER — ONDANSETRON 4 MG/1
4 TABLET, ORALLY DISINTEGRATING ORAL
Qty: 20 TAB | Refills: 0 | Status: SHIPPED | OUTPATIENT
Start: 2017-03-19 | End: 2017-12-17

## 2017-03-19 RX ORDER — LEVOFLOXACIN 500 MG/1
500 TABLET, FILM COATED ORAL
Status: COMPLETED | OUTPATIENT
Start: 2017-03-19 | End: 2017-03-19

## 2017-03-19 RX ORDER — LEVOFLOXACIN 500 MG/1
500 TABLET, FILM COATED ORAL DAILY
Qty: 6 TAB | Refills: 0 | Status: SHIPPED | OUTPATIENT
Start: 2017-03-19 | End: 2017-12-17

## 2017-03-19 RX ORDER — PHENAZOPYRIDINE HYDROCHLORIDE 100 MG/1
100 TABLET, FILM COATED ORAL
Status: COMPLETED | OUTPATIENT
Start: 2017-03-19 | End: 2017-03-19

## 2017-03-19 RX ADMIN — PHENAZOPYRIDINE 100 MG: 100 TABLET ORAL at 15:00

## 2017-03-19 RX ADMIN — LEVOFLOXACIN 500 MG: 500 TABLET, FILM COATED ORAL at 14:59

## 2017-03-19 RX ADMIN — IBUPROFEN 600 MG: 600 TABLET, FILM COATED ORAL at 14:55

## 2017-03-19 NOTE — ED PROVIDER NOTES
HPI Comments:   2:23 PM  Tiffanie Dueñas is a 35 y.o. female presenting to the ED C/O dysuria onset last night. Associated sx's incldue frequency and urgency. Pt reports she has UTIs twice a month. LMP was 2 weeks ago, pt denies chance of pregnancy. Pt last had a UTI one month ago and was given a shot of rocephin and started on Cipro. Pt denies fever, vomiting and any other symptoms or complaints. Written by ROXY Mcdonald, as dictated by Richard Patel PA-C     Patient is a 35 y.o. female presenting with frequency. The history is provided by the patient. No  was used. Urinary Frequency    This is a new problem. The current episode started yesterday. The problem occurs every urination. The problem has not changed since onset. Associated symptoms include frequency and urgency. Pertinent negatives include no vomiting. Past Medical History:   Diagnosis Date    Bipolar affective ([address]     Chronic obstructive pulmonary disease (HCC)     Depression     High cholesterol     Hypothyroidism     IBS (irritable bowel syndrome)     Migraine     Other ill-defined conditions(799.89)     frequent UTI's    UTI (urinary tract infection)        Past Surgical History:   Procedure Laterality Date    HX CHOLECYSTECTOMY      HX GYN      tubal ligation    HX HEENT      tonsillectomy         History reviewed. No pertinent family history. Social History     Social History    Marital status:      Spouse name: N/A    Number of children: N/A    Years of education: N/A     Occupational History    Not on file.      Social History Main Topics    Smoking status: Current Every Day Smoker     Packs/day: 0.50    Smokeless tobacco: Not on file    Alcohol use Yes      Comment: rarely    Drug use: No    Sexual activity: Yes     Birth control/ protection: Surgical     Other Topics Concern    Not on file     Social History Narrative         ALLERGIES: Sulfa (sulfonamide antibiotics) and Imitrex [sumatriptan succinate]    Review of Systems   Constitutional: Negative for fever. Gastrointestinal: Negative for vomiting. Genitourinary: Positive for dysuria, frequency and urgency. All other systems reviewed and are negative. Vitals:    03/19/17 1355   BP: 126/83   Pulse: 96   Resp: 16   Temp: 98 °F (36.7 °C)   SpO2: 100%   Weight: 86.2 kg (190 lb)   Height: 5' 3\" (1.6 m)            Physical Exam   Constitutional: She is oriented to person, place, and time. She appears well-developed and well-nourished. No distress. HENT:   Head: Normocephalic and atraumatic. Eyes: Conjunctivae and EOM are normal. Pupils are equal, round, and reactive to light. Neck: Normal range of motion. Neck supple. Musculoskeletal: Normal range of motion. Neurological: She is alert and oriented to person, place, and time. Skin: Skin is warm and dry. Psychiatric: She has a normal mood and affect. Her behavior is normal.   Nursing note and vitals reviewed.     RESULTS:    No orders to display        Labs Reviewed   URINALYSIS W/ RFLX MICROSCOPIC - Abnormal; Notable for the following:        Result Value    Protein TRACE (*)     Blood LARGE (*)     Leukocyte Esterase LARGE (*)     All other components within normal limits   URINE MICROSCOPIC ONLY - Abnormal; Notable for the following:     Bacteria 1+ (*)     All other components within normal limits       Recent Results (from the past 12 hour(s))   URINALYSIS W/ RFLX MICROSCOPIC    Collection Time: 03/19/17  2:20 PM   Result Value Ref Range    Color YELLOW      Appearance TURBID      Specific gravity 1.020 1.005 - 1.030      pH (UA) 5.5 5.0 - 8.0      Protein TRACE (A) NEG mg/dL    Glucose NEGATIVE  NEG mg/dL    Ketone NEGATIVE  NEG mg/dL    Bilirubin NEGATIVE  NEG      Blood LARGE (A) NEG      Urobilinogen 0.2 0.2 - 1.0 EU/dL    Nitrites NEGATIVE  NEG      Leukocyte Esterase LARGE (A) NEG     URINE MICROSCOPIC ONLY    Collection Time: 03/19/17  2:20 PM Result Value Ref Range    WBC TOO NUMEROUS TO COUNT 0 - 5 /hpf    RBC 11 to 20 0 - 5 /hpf    Epithelial cells FEW 0 - 5 /lpf    Bacteria 1+ (A) NEG /hpf         MDM  Number of Diagnoses or Management Options  UTI (urinary tract infection), uncomplicated:      Amount and/or Complexity of Data Reviewed  Clinical lab tests: ordered and reviewed      ED Course     MEDICATIONS GIVEN:  Medications   levoFLOXacin (LEVAQUIN) tablet 500 mg (not administered)   phenazopyridine (PYRIDIUM) tablet 100 mg (not administered)   ibuprofen (MOTRIN) tablet 600 mg (600 mg Oral Given 3/19/17 4705)        Procedures    PROGRESS NOTE:  2:23 PM  Initial assessment performed. Written by Poncho Rocha, ED Scribe, as dictated by Richard Patel PA-C     DISCHARGE NOTE:  2:55 PM  Carolina Schaffer  results have been reviewed with her. She has been counseled regarding her diagnosis, treatment, and plan. She verbally conveys understanding and agreement of the signs, symptoms, diagnosis, treatment and prognosis and additionally agrees to follow up as discussed. She also agrees with the care-plan and conveys that all of her questions have been answered. I have also provided discharge instructions for her that include: educational information regarding their diagnosis and treatment, and list of reasons why they would want to return to the ED prior to their follow-up appointment, should her condition change. The patient and/or family has been provided with education for proper Emergency Department utilization.     CLINICAL IMPRESSION:    1. UTI (urinary tract infection), uncomplicated        PLAN: DISCHARGE HOME    Follow-up Information     Follow up With Details Comments Contact Info    Lester Bautista MD Schedule an appointment as soon as possible for a visit in 2 days  [address] Drew Buchanan MD Schedule an appointment as soon as possible for a visit in 2 days  [address] [facility] [address]  [phone]      [facility] EMERGENCY DEPT  As needed, If symptoms worsen [address] [facility]  [phone]          Current Discharge Medication List      START taking these medications    Details   levoFLOXacin (LEVAQUIN) 500 mg tablet Take 1 Tab by mouth daily. Qty: 6 Tab, Refills: 0      phenazopyridine (PYRIDIUM) 100 mg tablet Take 1 Tab by mouth three (3) times daily (after meals) for 3 days. Qty: 9 Tab, Refills: 0         CONTINUE these medications which have CHANGED    Details   ondansetron (ZOFRAN ODT) 4 mg disintegrating tablet Take 1 Tab by mouth every eight (8) hours as needed for Nausea. Qty: 20 Tab, Refills: 0             ATTESTATIONS:  This note is prepared by Julian Romo, acting as Scribe for Richard Patel PA-C . Richard Patel PA-C: The scribe's documentation has been prepared under my direction and personally reviewed by me in its entirety. I confirm that the note above accurately reflects all work, treatment, procedures, and medical decision making performed by me.

## 2017-03-19 NOTE — ED TRIAGE NOTES
amb into ed w/ reports pmh frequent uti's - onset last pm - urgency and dysurea reported - no fevers reported.

## 2017-03-19 NOTE — DISCHARGE INSTRUCTIONS
Urinary Tract Infection in Women: Care Instructions  Your Care Instructions    A urinary tract infection, or UTI, is a general term for an infection anywhere between the kidneys and the urethra (where urine comes out). Most UTIs are bladder infections. They often cause pain or burning when you urinate. UTIs are caused by bacteria and can be cured with antibiotics. Be sure to complete your treatment so that the infection goes away. Follow-up care is a key part of your treatment and safety. Be sure to make and go to all appointments, and call your doctor if you are having problems. It's also a good idea to know your test results and keep a list of the medicines you take. How can you care for yourself at home? · Take your antibiotics as directed. Do not stop taking them just because you feel better. You need to take the full course of antibiotics. · Drink extra water and other fluids for the next day or two. This may help wash out the bacteria that are causing the infection. (If you have kidney, heart, or liver disease and have to limit fluids, talk with your doctor before you increase your fluid intake.)  · Avoid drinks that are carbonated or have caffeine. They can irritate the bladder. · Urinate often. Try to empty your bladder each time. · To relieve pain, take a hot bath or lay a heating pad set on low over your lower belly or genital area. Never go to sleep with a heating pad in place. To prevent UTIs  · Drink plenty of water each day. This helps you urinate often, which clears bacteria from your system. (If you have kidney, heart, or liver disease and have to limit fluids, talk with your doctor before you increase your fluid intake.)  · Consider adding cranberry juice to your diet. · Urinate when you need to. · Urinate right after you have sex. · Change sanitary pads often. · Avoid douches, bubble baths, feminine hygiene sprays, and other feminine hygiene products that have deodorants.   · After going to the bathroom, wipe from front to back. When should you call for help? Call your doctor now or seek immediate medical care if:  · Symptoms such as fever, chills, nausea, or vomiting get worse or appear for the first time. · You have new pain in your back just below your rib cage. This is called flank pain. · There is new blood or pus in your urine. · You have any problems with your antibiotic medicine. Watch closely for changes in your health, and be sure to contact your doctor if:  · You are not getting better after taking an antibiotic for 2 days. · Your symptoms go away but then come back. Where can you learn more? Go to http://aury-cruz.info/. Enter S394 in the search box to learn more about \"Urinary Tract Infection in Women: Care Instructions. \"  Current as of: August 12, 2016  Content Version: 11.1  © 5523-8388 [employer]. Care instructions adapted under license by [employer] (which disclaims liability or warranty for this information). If you have questions about a medical condition or this instruction, always ask your healthcare professional. [address] any warranty or liability for your use of this information.

## 2017-03-19 NOTE — LETTER
[facility] [place] 
[facility] 
[address] [id number] [phone] Work/School Note Date: 3/19/2017 To Whom It May concern: 
 
Ren Manzanares was seen and treated today in the emergency room by the following provider(s): 
Attending Provider: Ramses Alston MD 
Physician Assistant: KASSY Caldera.   
 
Ren Manzanares should be excused from work for 1-2 days;   May return sooner only if feels better;  
 
Sincerely, 
 
 
 
 
KASSY Caldera

## 2017-12-17 ENCOUNTER — HOSPITAL ENCOUNTER (EMERGENCY)
Age: 42
Discharge: HOME | End: 2017-12-17
Attending: INTERNAL MEDICINE
Payer: COMMERCIAL

## 2017-12-17 VITALS
RESPIRATION RATE: 16 BRPM | BODY MASS INDEX: 31.89 KG/M2 | HEART RATE: 85 BPM | SYSTOLIC BLOOD PRESSURE: 105 MMHG | DIASTOLIC BLOOD PRESSURE: 51 MMHG | OXYGEN SATURATION: 100 % | WEIGHT: 180 LBS | HEIGHT: 63 IN | TEMPERATURE: 97.3 F

## 2017-12-17 DIAGNOSIS — N30.01 ACUTE CYSTITIS WITH HEMATURIA: Primary | ICD-10-CM

## 2017-12-17 LAB
APPEARANCE UR: ABNORMAL
BACTERIA URNS QL MICRO: ABNORMAL /HPF
BILIRUB UR QL: NEGATIVE
CAOX CRY URNS QL MICRO: ABNORMAL
COLOR UR: YELLOW
EPITH CASTS URNS QL MICRO: ABNORMAL /LPF (ref 0–5)
GLUCOSE UR STRIP.AUTO-MCNC: NEGATIVE MG/DL
HCG UR QL: NEGATIVE
HCG UR QL: NEGATIVE
HGB UR QL STRIP: ABNORMAL
KETONES UR QL STRIP.AUTO: NEGATIVE MG/DL
LEUKOCYTE ESTERASE UR QL STRIP.AUTO: ABNORMAL
NITRITE UR QL STRIP.AUTO: NEGATIVE
PH UR STRIP: 5 [PH] (ref 5–8)
PROT UR STRIP-MCNC: ABNORMAL MG/DL
RBC #/AREA URNS HPF: ABNORMAL /HPF (ref 0–5)
SP GR UR REFRACTOMETRY: 1.02 (ref 1–1.03)
UROBILINOGEN UR QL STRIP.AUTO: 0.2 EU/DL (ref 0.2–1)
WBC URNS QL MICRO: ABNORMAL /HPF (ref 0–5)
YEAST URNS QL MICRO: ABNORMAL

## 2017-12-17 PROCEDURE — 96372 THER/PROPH/DIAG INJ SC/IM: CPT

## 2017-12-17 PROCEDURE — 74011250637 HC RX REV CODE- 250/637: Performed by: INTERNAL MEDICINE

## 2017-12-17 PROCEDURE — 74011000250 HC RX REV CODE- 250: Performed by: INTERNAL MEDICINE

## 2017-12-17 PROCEDURE — 81001 URINALYSIS AUTO W/SCOPE: CPT | Performed by: INTERNAL MEDICINE

## 2017-12-17 PROCEDURE — 74011250636 HC RX REV CODE- 250/636: Performed by: INTERNAL MEDICINE

## 2017-12-17 PROCEDURE — 99283 EMERGENCY DEPT VISIT LOW MDM: CPT

## 2017-12-17 PROCEDURE — 81025 URINE PREGNANCY TEST: CPT

## 2017-12-17 RX ORDER — WATER FOR INJECTION,STERILE
VIAL (ML) INJECTION
Status: DISCONTINUED
Start: 2017-12-17 | End: 2017-12-17 | Stop reason: HOSPADM

## 2017-12-17 RX ORDER — PHENAZOPYRIDINE HYDROCHLORIDE 100 MG/1
200 TABLET, FILM COATED ORAL ONCE
Status: COMPLETED | OUTPATIENT
Start: 2017-12-17 | End: 2017-12-17

## 2017-12-17 RX ORDER — CEPHALEXIN 500 MG/1
500 CAPSULE ORAL 3 TIMES DAILY
Qty: 21 CAP | Refills: 0 | Status: SHIPPED | OUTPATIENT
Start: 2017-12-17 | End: 2017-12-24

## 2017-12-17 RX ORDER — NITROFURANTOIN (MACROCRYSTALS) 100 MG/1
100 CAPSULE ORAL 2 TIMES DAILY
Qty: 14 CAP | Refills: 0 | Status: SHIPPED | OUTPATIENT
Start: 2017-12-17 | End: 2017-12-24

## 2017-12-17 RX ORDER — CEFTRIAXONE 250 MG/8ML
1000 INJECTION, POWDER, FOR SOLUTION INTRAMUSCULAR; INTRAVENOUS ONCE
Status: COMPLETED | OUTPATIENT
Start: 2017-12-17 | End: 2017-12-17

## 2017-12-17 RX ORDER — WATER FOR INJECTION,STERILE
VIAL (ML) INJECTION ONCE
Status: COMPLETED | OUTPATIENT
Start: 2017-12-17 | End: 2017-12-17

## 2017-12-17 RX ORDER — PHENAZOPYRIDINE HYDROCHLORIDE 200 MG/1
200 TABLET, FILM COATED ORAL 3 TIMES DAILY
Qty: 6 TAB | Refills: 0 | Status: SHIPPED | OUTPATIENT
Start: 2017-12-17 | End: 2017-12-19

## 2017-12-17 RX ADMIN — WATER 1 ML: 1 INJECTION INTRAMUSCULAR; INTRAVENOUS; SUBCUTANEOUS at 04:13

## 2017-12-17 RX ADMIN — CEFTRIAXONE 1000 MG: 1 INJECTION, POWDER, FOR SOLUTION INTRAMUSCULAR; INTRAVENOUS at 04:20

## 2017-12-17 RX ADMIN — PHENAZOPYRIDINE HYDROCHLORIDE 200 MG: 100 TABLET ORAL at 04:19

## 2017-12-17 NOTE — ED PROVIDER NOTES
EMERGENCY DEPARTMENT HISTORY AND PHYSICAL EXAM    Date: 12/17/2017  Patient Name: Judit Mancini    History of Presenting Illness     Chief Complaint   Patient presents with    Urinary Pain         History Provided By: Patient    Chief Complaint: urinary pain  Duration: 4 Days  Timing:  Constant  Location: vaginal  Quality: Burning  Severity: Moderate  Modifying Factors: none  Associated Symptoms: dysuria, frequent urination, and back pain    Additional History (Context):   3:32 AM  Judit Mancini is a 38 y.o. female with PMHX of frequent UTIs who presents to the emergency department C/O urinary pain. Associated sxs include dysuria, frequent urination, and back pain. Pt reports she use to get UTI's frequently but has not had one in a while. Endorses cigarette use (1 ppd). PSHx cholecystectomy. Pt denies abdominal pain, chest pain, fever, chills, N/V/D, vaginal discharge, rash, and any other sxs or complaints. PCP: Ligia Bajwa MD    Current Facility-Administered Medications   Medication Dose Route Frequency Provider Last Rate Last Dose    sterile water (preservative free) injection              Current Outpatient Prescriptions   Medication Sig Dispense Refill    phenazopyridine (PYRIDIUM) 200 mg tablet Take 1 Tab by mouth three (3) times daily for 2 days. 6 Tab 0    nitrofurantoin (MACRODANTIN) 100 mg capsule Take 1 Cap by mouth two (2) times a day for 7 days. 14 Cap 0    cephALEXin (KEFLEX) 500 mg capsule Take 1 Cap by mouth three (3) times daily for 7 days. To take if no improvement of symptoms in 48 hours 21 Cap 0    BUTALB/ACETAMINOPHEN/CAFFEINE (FIORICET PO) Take  by mouth.  HYDROcodone-acetaminophen (NORCO) 5-325 mg per tablet Take 1 Tab by mouth every four (4) hours as needed for Pain. 20 Tab 0    fluticasone (FLONASE) 50 mcg/actuation nasal spray 1 [place] by Both Nostrils route daily. 1 spray in each nostril. 1 Bottle 0    sertraline (ZOLOFT) 100 mg tablet Take  by mouth daily.       levothyroxine (SYNTHROID) 150 mcg tablet Take  by mouth Daily (before breakfast).  esomeprazole (NEXIUM) 40 mg capsule Take  by mouth daily.  ziprasidone (GEODON) 40 mg capsule Take 60 mg by mouth daily.  zolpidem (AMBIEN) 10 mg tablet Take  by mouth nightly as needed.  ALPRAZolam (XANAX) 1 mg tablet Take  by mouth. Past History     Past Medical History:  Past Medical History:   Diagnosis Date    Bipolar affective ([address]     Chronic obstructive pulmonary disease ([facility] [address]     Depression     High cholesterol     Hypothyroidism     IBS (irritable bowel syndrome)     Migraine     Other ill-defined conditions(799.89)     frequent UTI's    UTI (urinary tract infection)        Past Surgical History:  Past Surgical History:   Procedure Laterality Date    HX CHOLECYSTECTOMY      HX GYN      tubal ligation    HX HEENT      tonsillectomy       Family History:  History reviewed. No pertinent family history. Social History:  Social History   Substance Use Topics    Smoking status: Current Every Day Smoker     Packs/day: 0.50    Smokeless tobacco: None    Alcohol use Yes      Comment: rarely       Allergies: Allergies   Allergen Reactions    Sulfa (Sulfonamide Antibiotics) Hives    Imitrex [Sumatriptan Succinate] Other (comments)     \"makes headaches worse\"         Review of Systems   Review of Systems   Constitutional: Negative for chills and fever. Cardiovascular: Negative for chest pain. Gastrointestinal: Negative for abdominal pain, diarrhea, nausea and vomiting. Genitourinary: Positive for dysuria, frequency and vaginal pain. Musculoskeletal: Positive for back pain. Skin: Negative for rash. All other systems reviewed and are negative.       Physical Exam     Vitals:    12/17/17 0259   BP: 105/51   Pulse: 85   Resp: 16   Temp: 97.3 °F (36.3 °C)   SpO2: 100%   Weight: 81.6 kg (180 lb)   Height: 5' 3\" (1.6 m)     Physical Exam   Constitutional: She is oriented to person, place, and time. She appears well-developed and well-nourished. No distress. HENT:   Head: Normocephalic and atraumatic. Right Ear: External ear normal.   Left Ear: External ear normal.   Nose: Nose normal.   Mouth/Throat: Oropharynx is clear and moist.   Eyes: Conjunctivae and EOM are normal. Pupils are equal, round, and reactive to light. Right eye exhibits no discharge. Left eye exhibits no discharge. No scleral icterus. Neck: Normal range of motion. Neck supple. No JVD present. No tracheal deviation present. Cardiovascular: Normal rate, regular rhythm, normal heart sounds and intact distal pulses. Pulmonary/Chest: Effort normal. No respiratory distress. She has wheezes (occasional). Abdominal: Soft. Bowel sounds are normal. She exhibits no distension. There is no tenderness. No HSM   Musculoskeletal: Normal range of motion. Neurological: She is alert and oriented to person, place, and time. She has normal reflexes. No focal motor weakness. Skin: Skin is warm and dry. No rash noted. Psychiatric: She has a normal mood and affect. Her behavior is normal.   Nursing note and vitals reviewed.         Diagnostic Study Results     Labs -     Recent Results (from the past 12 hour(s))   URINALYSIS W/ RFLX MICROSCOPIC    Collection Time: 12/17/17  3:00 AM   Result Value Ref Range    Color YELLOW      Appearance TURBID      Specific gravity 1.024 1.005 - 1.030      pH (UA) 5.0 5.0 - 8.0      Protein TRACE (A) NEG mg/dL    Glucose NEGATIVE  NEG mg/dL    Ketone NEGATIVE  NEG mg/dL    Bilirubin NEGATIVE  NEG      Blood LARGE (A) NEG      Urobilinogen 0.2 0.2 - 1.0 EU/dL    Nitrites NEGATIVE  NEG      Leukocyte Esterase LARGE (A) NEG     URINE MICROSCOPIC ONLY    Collection Time: 12/17/17  3:00 AM   Result Value Ref Range    WBC TOO NUMEROUS TO COUNT 0 - 5 /hpf    RBC 20 to 30 0 - 5 /hpf    Epithelial cells 1+ 0 - 5 /lpf    Bacteria 4+ (A) NEG /hpf    CA Oxalate crystals 1+ (A) NEG    Yeast 1+ (A) NEG HCG URINE, QL. - POC    Collection Time: 12/17/17  3:07 AM   Result Value Ref Range    Pregnancy test,urine (POC) NEGATIVE  NEG     HCG URINE, QL. - POC    Collection Time: 12/17/17  3:16 AM   Result Value Ref Range    Pregnancy test,urine (POC) NEGATIVE  NEG         Radiologic Studies -   No orders to display     CT Results  (Last 48 hours)    None        CXR Results  (Last 48 hours)    None          Medications given in the ED-  Medications   sterile water (preservative free) injection (not administered)   cefTRIAXone (ROCEPHIN) injection 1,000 mg (1,000 mg IntraMUSCular Given 12/17/17 0420)   phenazopyridine (PYRIDIUM) tablet 200 mg (200 mg Oral Given 12/17/17 0419)   sterile water (preservative free) injection (1 mL IntraMUSCular Given 12/17/17 0413)         Medical Decision Making   I am the first provider for this patient. I reviewed the vital signs, available nursing notes, past medical history, past surgical history, family history and social history. Vital Signs-Reviewed the patient's vital signs. Pulse Oximetry Analysis - 100% on RA     Records Reviewed: Nursing Notes    Provider Notes (Medical Decision Making):    Ddx: UTI. Does not appear to have flank tenderness or back tenderness; pt not septic or toxic. Procedures:  Procedures    ED Course:   3:32 AM  Initial assessment performed. The patients presenting problems have been discussed, and they are in agreement with the care plan formulated and outlined with them. I have encouraged them to ask questions as they arise throughout their visit. Diagnosis and Disposition       DISCHARGE NOTE:  3:39 AM  Mark Karuse's  results have been reviewed with her. She has been counseled regarding her diagnosis, treatment, and plan. She verbally conveys understanding and agreement of the signs, symptoms, diagnosis, treatment and prognosis and additionally agrees to follow up as discussed.   She also agrees with the care-plan and conveys that all of her questions have been answered. I have also provided discharge instructions for her that include: educational information regarding their diagnosis and treatment, and list of reasons why they would want to return to the ED prior to their follow-up appointment, should her condition change. She has been provided with education for proper emergency department utilization. CLINICAL IMPRESSION:    1. Acute cystitis with hematuria        PLAN:  1. D/C Home  2. Discharge Medication List as of 12/17/2017  3:44 AM      START taking these medications    Details   phenazopyridine (PYRIDIUM) 200 mg tablet Take 1 Tab by mouth three (3) times daily for 2 days. , Normal, Disp-6 Tab, R-0      nitrofurantoin (MACRODANTIN) 100 mg capsule Take 1 Cap by mouth two (2) times a day for 7 days. , Normal, Disp-14 Cap, R-0      cephALEXin (KEFLEX) 500 mg capsule Take 1 Cap by mouth three (3) times daily for 7 days. To take if no improvement of symptoms in 48 hours, Normal, Disp-21 Cap, R-0         CONTINUE these medications which have NOT CHANGED    Details   BUTALB/ACETAMINOPHEN/CAFFEINE (FIORICET PO) Take  by mouth., Historical Med      HYDROcodone-acetaminophen (NORCO) 5-325 mg per tablet Take 1 Tab by mouth every four (4) hours as needed for Pain., Print, Disp-20 Tab, R-0      fluticasone (FLONASE) 50 mcg/actuation nasal spray 1 [place] by Both Nostrils route daily. 1 spray in each nostril., Print, Disp-1 Bottle, R-0      sertraline (ZOLOFT) 100 mg tablet Take  by mouth daily. , Historical Med      levothyroxine (SYNTHROID) 150 mcg tablet Take  by mouth Daily (before breakfast). , Historical Med      esomeprazole (NEXIUM) 40 mg capsule Take  by mouth daily. , Historical Med      ziprasidone (GEODON) 40 mg capsule Take 60 mg by mouth daily. , Historical Med      zolpidem (AMBIEN) 10 mg tablet Take  by mouth nightly as needed., Historical Med      ALPRAZolam (XANAX) 1 mg tablet Take  by mouth., Historical Med           3. Follow-up Information     Follow up With Details Comments [id number] Kamini Pimentel MD Schedule an appointment as soon as possible for a visit in 2 days For primary care follow up [address]      [facility] EMERGENCY DEPT Go to As needed, if symptoms worsen [address] [address]  [phone]        _______________________________    Attestations: This note is prepared by Vel Reyna, acting as Scribe for Jesusita Nieves MD.    Jesusita Nieves MD:  The scribe's documentation has been prepared under my direction and personally reviewed by me in its entirety.   I confirm that the note above accurately reflects all work, treatment, procedures, and medical decision making performed by me.  _______________________________

## 2017-12-17 NOTE — ED NOTES
I have reviewed discharge instructions with the patient. The patient verbalized understanding. Patient armband removed and shredded.  Patient given Rx x 3

## 2017-12-17 NOTE — DISCHARGE INSTRUCTIONS

## 2018-01-13 ENCOUNTER — HOSPITAL ENCOUNTER (EMERGENCY)
Age: 43
Discharge: HOME | End: 2018-01-13
Attending: EMERGENCY MEDICINE
Payer: COMMERCIAL

## 2018-01-13 ENCOUNTER — APPOINTMENT (OUTPATIENT)
Dept: GENERAL RADIOLOGY | Age: 43
End: 2018-01-13
Attending: PHYSICIAN ASSISTANT
Payer: COMMERCIAL

## 2018-01-13 VITALS
HEART RATE: 99 BPM | SYSTOLIC BLOOD PRESSURE: 117 MMHG | BODY MASS INDEX: 32.78 KG/M2 | RESPIRATION RATE: 18 BRPM | HEIGHT: 63 IN | TEMPERATURE: 98.2 F | WEIGHT: 185 LBS | DIASTOLIC BLOOD PRESSURE: 56 MMHG | OXYGEN SATURATION: 100 %

## 2018-01-13 DIAGNOSIS — J20.9 COPD WITH ACUTE BRONCHITIS (HCC): Primary | ICD-10-CM

## 2018-01-13 DIAGNOSIS — J44.0 COPD WITH ACUTE BRONCHITIS (HCC): Primary | ICD-10-CM

## 2018-01-13 PROCEDURE — 74011000250 HC RX REV CODE- 250: Performed by: PHYSICIAN ASSISTANT

## 2018-01-13 PROCEDURE — 94640 AIRWAY INHALATION TREATMENT: CPT

## 2018-01-13 PROCEDURE — 93005 ELECTROCARDIOGRAM TRACING: CPT

## 2018-01-13 PROCEDURE — 99284 EMERGENCY DEPT VISIT MOD MDM: CPT

## 2018-01-13 PROCEDURE — 77030029684 HC NEB SM VOL KT MONA -A

## 2018-01-13 PROCEDURE — 71046 X-RAY EXAM CHEST 2 VIEWS: CPT

## 2018-01-13 RX ORDER — ALBUTEROL SULFATE 2.5 MG/.5ML
2.5 SOLUTION RESPIRATORY (INHALATION)
Status: COMPLETED | OUTPATIENT
Start: 2018-01-13 | End: 2018-01-13

## 2018-01-13 RX ORDER — CODEINE PHOSPHATE AND GUAIFENESIN 10; 100 MG/5ML; MG/5ML
7.5 SOLUTION ORAL
Qty: 118 ML | Refills: 0 | Status: SHIPPED | OUTPATIENT
Start: 2018-01-13 | End: 2018-09-21

## 2018-01-13 RX ORDER — IPRATROPIUM BROMIDE AND ALBUTEROL SULFATE 2.5; .5 MG/3ML; MG/3ML
3 SOLUTION RESPIRATORY (INHALATION)
Status: COMPLETED | OUTPATIENT
Start: 2018-01-13 | End: 2018-01-13

## 2018-01-13 RX ORDER — CLONAZEPAM 1 MG/1
1 TABLET ORAL 2 TIMES DAILY
COMMUNITY

## 2018-01-13 RX ORDER — METHYLPREDNISOLONE 4 MG/1
TABLET ORAL
Qty: 1 DOSE PACK | Refills: 0 | Status: SHIPPED | OUTPATIENT
Start: 2018-01-13 | End: 2018-09-21

## 2018-01-13 RX ORDER — AZITHROMYCIN 250 MG/1
TABLET, FILM COATED ORAL
Qty: 6 TAB | Refills: 0 | Status: SHIPPED | OUTPATIENT
Start: 2018-01-13 | End: 2018-01-18

## 2018-01-13 RX ADMIN — IPRATROPIUM BROMIDE AND ALBUTEROL SULFATE 3 ML: .5; 3 SOLUTION RESPIRATORY (INHALATION) at 11:41

## 2018-01-13 RX ADMIN — ALBUTEROL SULFATE 2.5 MG: 2.5 SOLUTION RESPIRATORY (INHALATION) at 12:56

## 2018-01-13 NOTE — ED NOTES
Discharged per ambulatory, no acute distress on discharge, written inst with rx x 3 given to pt, verbalizes understanding  Patient armband removed and shredded

## 2018-01-13 NOTE — ED PROVIDER NOTES
EMERGENCY DEPARTMENT HISTORY AND PHYSICAL EXAM    Date: 1/13/2018  Patient Name: Gail Merchant    History of Presenting Illness     Chief Complaint   Patient presents with    Cough    Shortness of Breath         History Provided By: Patient    Chief Complaint: Cough  Duration: 2 Weeks  Timing:  Worsening  Severity: mild  Modifying Factors: Spiriva and Albuterol inhalers with mild relief  Associated Symptoms: fever (at home 101, 98.2 in ER) and SOB    Additional History (Context):   11:33 AM  Gail Merchant is a 38 y.o. female with PMHX of COPD who presents to the emergency department C/O worsening cough onset 2 weeks. Pt states she had the flu 2 weeks ago and this cough is not going away. Associated sxs include fever (101 at home, 98.2 in ER) and SOB. Pt uses an Spiriva and Albuterol inhalers with mild relief. Pt denies using nebulizer treatments, and any other sxs or complaints. PCP: Steve Llamas MD    Current Outpatient Prescriptions   Medication Sig Dispense Refill    clonazePAM (KLONOPIN) 1 mg tablet Take 1 mg by mouth two (2) times a day.  azithromycin (ZITHROMAX Z-FATOU) 250 mg tablet Take 2 tabs by mouth on day 1. Take 1 tab by mouth on days 2-5. 6 Tab 0    methylPREDNISolone (MEDROL, FATOU,) 4 mg tablet Take as package directs 1 Dose Pack 0    guaiFENesin-codeine (ROBITUSSIN AC) 100-10 mg/5 mL solution Take 7.5 mL by mouth three (3) times daily as needed for Cough. Max Daily Amount: 22.5 mL. 118 mL 0    BUTALB/ACETAMINOPHEN/CAFFEINE (FIORICET PO) Take  by mouth.  HYDROcodone-acetaminophen (NORCO) 5-325 mg per tablet Take 1 Tab by mouth every four (4) hours as needed for Pain. 20 Tab 0    fluticasone (FLONASE) 50 mcg/actuation nasal spray 1 Nolan by Both Nostrils route daily. 1 spray in each nostril. 1 Bottle 0    sertraline (ZOLOFT) 100 mg tablet Take  by mouth daily.  levothyroxine (SYNTHROID) 150 mcg tablet Take  by mouth Daily (before breakfast).       esomeprazole (NEXIUM) 40 mg capsule Take  by mouth daily.  ziprasidone (GEODON) 40 mg capsule Take 60 mg by mouth daily.  zolpidem (AMBIEN) 10 mg tablet Take  by mouth nightly as needed. Past History     Past Medical History:  Past Medical History:   Diagnosis Date    [facility]     Chronic obstructive pulmonary disease ([facility]     Depression     High cholesterol     Hypothyroidism     IBS (irritable bowel syndrome)     Migraine     Other ill-defined conditions(799.89)     frequent UTI's    UTI (urinary tract infection)        Past Surgical History:  Past Surgical History:   Procedure Laterality Date    HX CHOLECYSTECTOMY      HX GYN      tubal ligation    HX HEENT      tonsillectomy       Family History:  History reviewed. No pertinent family history. Social History:  Social History   Substance Use Topics    Smoking status: Current Every Day Smoker     Packs/day: 0.50    Smokeless tobacco: Never Used    Alcohol use Yes      Comment: rarely       Allergies: Allergies   Allergen Reactions    Sulfa (Sulfonamide Antibiotics) Hives    Imitrex [Sumatriptan Succinate] Other (comments)     \"makes headaches worse\"         Review of Systems   Review of Systems   Constitutional: Positive for fever (at home 101, 98.2 in ER). Negative for chills. Respiratory: Positive for cough and shortness of breath. All other systems reviewed and are negative. Physical Exam     Vitals:    01/13/18 1043 01/13/18 1258 01/13/18 1315   BP: 139/67  117/56   Pulse: (!) 101  99   Resp: 18  18   Temp: 98.2 °F (36.8 °C)     SpO2: 100% 98% 100%   Weight: 83.9 kg (185 lb)     Height: 5' 3\" (1.6 m)       Physical Exam   Constitutional: She is oriented to person, place, and time. Vital signs are normal. She appears well-developed and well-nourished. No distress. Pt appears to not feel well though non-toxic and in NAD   HENT:   Head: Normocephalic and atraumatic.    Right Ear: External ear normal.   Left Ear: External ear normal.   Nose: Nose normal.   Mouth/Throat: Oropharynx is clear and moist. No oropharyngeal exudate. Eyes: Conjunctivae and EOM are normal. Pupils are equal, round, and reactive to light. Neck: Normal range of motion. Neck supple. Cardiovascular: Normal rate, regular rhythm and normal heart sounds. Pulmonary/Chest: Effort normal. No respiratory distress. She has wheezes (moderate diffuse wheezing (significantly improved s/p duoneb, resolved s/p 2nd neb tx)). She has no rales. She exhibits no tenderness. Abdominal: Soft. Bowel sounds are normal. She exhibits no distension and no mass. There is no tenderness. There is no rebound and no guarding. Musculoskeletal: Normal range of motion. Neurological: She is alert and oriented to person, place, and time. Skin: Skin is warm and dry. She is not diaphoretic. Psychiatric: She has a normal mood and affect. Her behavior is normal.   Nursing note and vitals reviewed.         Diagnostic Study Results     Labs -     Recent Results (from the past 12 hour(s))   EKG, 12 LEAD, INITIAL    Collection Time: 01/13/18 10:51 AM   Result Value Ref Range    Ventricular Rate 91 BPM    Atrial Rate 91 BPM    P-R Interval 140 ms    QRS Duration 92 ms    Q-T Interval 370 ms    QTC Calculation (Bezet) 455 ms    Calculated P Axis 46 degrees    Calculated R Axis 76 degrees    Calculated T Axis 28 degrees    Diagnosis       Normal sinus rhythm  Nonspecific T wave abnormality  Abnormal ECG  When compared with ECG of 21-JUN-2014 18:05,  Nonspecific T wave abnormality now evident in Anterior leads         Radiologic Studies -   XR CHEST PA LAT   Final Result        CT Results  (Last 48 hours)    None        CXR Results  (Last 48 hours)               01/13/18 1154  XR CHEST PA LAT Final result    Impression:  IMPRESSION: No acute radiographic cardiopulmonary abnormality                       Narrative:  EXAM:  XR CHEST PA LAT       INDICATION: Cough and wheezing, persistent in nature. COMPARISON: Several prior exams, most recently 8/3/2014. FINDINGS: PA and lateral radiographs of the chest demonstrate no focal pneumonic   opacity, pneumothorax or pleural effusion. Cardiac size and mediastinal contours   are normal. Pulmonary vascularity is within normal limits. No acute osseous   abnormality. Medications given in the ED-  Medications   albuterol-ipratropium (DUO-NEB) 2.5 MG-0.5 MG/3 ML (3 mL Nebulization Given 1/13/18 1141)   albuterol CONCENTRATE 2.5mg/0.5 mL neb soln (2.5 mg Nebulization Given 1/13/18 1256)         Medical Decision Making   I am the first provider for this patient. I reviewed the vital signs, available nursing notes, past medical history, past surgical history, family history and social history. Vital Signs-Reviewed the patient's vital signs. Pulse Oximetry Analysis - 100% on RA     EKG interpretation: (Preliminary)  10:51 AM  91 BPM, NSR, Nonspecific T wave abnormality, No STEMI  EKG read by Laureano Andujar PA-C. at 12:00 PM      Records Reviewed: Nursing Notes    Provider Notes (Medical Decision Making):     Procedures:  Procedures    ED Course:   11:33 AM  Initial assessment performed. The patients presenting problems have been discussed, and they are in agreement with the care plan formulated and outlined with them. I have encouraged them to ask questions as they arise throughout their visit. 12:35 PM  Pt's breath sounds significantly improved s/p nebulizer treatment with mild wheezing throughout. X-ray negative for PNA. Will order Albuterol nebulizer treatment and plan to disposition home. Diagnosis and Disposition       DISCHARGE NOTE:  1:04 PM  Orly Krause's  results have been reviewed with her. She has been counseled regarding her diagnosis, treatment, and plan.   She verbally conveys understanding and agreement of the signs, symptoms, diagnosis, treatment and prognosis and additionally agrees to follow up as discussed. She also agrees with the care-plan and conveys that all of her questions have been answered. I have also provided discharge instructions for her that include: educational information regarding their diagnosis and treatment, and list of reasons why they would want to return to the ED prior to their follow-up appointment, should her condition change. She has been provided with education for proper emergency department utilization. CLINICAL IMPRESSION:    1. COPD with acute bronchitis ([facility]        PLAN:  1. D/C Home  2. Current Discharge Medication List      START taking these medications    Details   azithromycin (ZITHROMAX Z-FATOU) 250 mg tablet Take 2 tabs by mouth on day 1. Take 1 tab by mouth on days 2-5. Qty: 6 Tab, Refills: 0      methylPREDNISolone (MEDROL, FATOU,) 4 mg tablet Take as package directs  Qty: 1 Dose Pack, Refills: 0      guaiFENesin-codeine (ROBITUSSIN AC) 100-10 mg/5 mL solution Take 7.5 mL by mouth three (3) times daily as needed for Cough. Max Daily Amount: 22.5 mL. Qty: 118 mL, Refills: 0    Associated Diagnoses: COPD with acute bronchitis ([facility]           3. Follow-up Information     Follow up With Details Comments [id number] Kamini Pimentel MD Schedule an appointment as soon as possible for a visit in 2 days For primary care follow up [address]      THE [facility] EMERGENCY DEPT Go to As needed, if symptoms worsen [address] [place] [id number]  [phone]        _______________________________    Attestations: This note is prepared by Gregor Kelly, acting as Scribe for Laureano [employer] colon. [employer], PA-C.:  The scribe's documentation has been prepared under my direction and personally reviewed by me in its entirety.   I confirm that the note above accurately reflects all work, treatment, procedures, and medical decision making performed by me.  _______________________________

## 2018-01-13 NOTE — DISCHARGE INSTRUCTIONS
Learning About COPD and How to Prevent Lung Infections  How do lung infections affect COPD? Lung infections like pneumonia and acute bronchitis are common causes of COPD flare-ups. And people who have COPD are more likely to get these lung infections, especially if they smoke. When you have COPD, it is important to know the symptoms of pneumonia and acute bronchitis and call your doctor if you have them. Symptoms include:  · A cough that brings up more mucus than usual.  · Fever. · Shortness of breath. What can you do to prevent these infections? Stay healthy  · Get a flu shot every year. · Get a pneumococcal vaccine shot. If you have had one before, ask your doctor whether you need another dose. Two different types of pneumococcal vaccines are recommended for people ages 72 and older. · If you must be around people with colds or the flu, wash your hands often. · Do not smoke. This is the most important step you can take to prevent more damage to your lungs. If you need help quitting, talk to your doctor about stop-smoking programs and medicines. These can increase your chances of quitting for good. · Avoid secondhand smoke, air pollution, and high altitudes. Also avoid cold, dry air and hot, humid air. Stay at home with your windows closed when air pollution is bad. Exercise and eat well  · If your doctor recommends it, get more exercise. Walking is a good choice. Bit by bit, increase the amount you walk every day. Try for at least 30 minutes on most days of the week. · Eat regular, well-balanced meals. Eating right keeps your energy levels up and helps your body fight infection. · Get plenty of rest and sleep. Follow-up care is a key part of your treatment and safety. Be sure to make and go to all appointments, and call your doctor if you are having problems. It's also a good idea to know your test results and keep a list of the medicines you take. Where can you learn more?   Go to http://aury-cruz.info/. Enter S130 in the search box to learn more about \"Learning About COPD and How to Prevent Lung Infections. \"  Current as of: May 12, 2017  Content Version: 11.4  © 1695-5952 [employer]. Care instructions adapted under license by [employer] (which disclaims liability or warranty for this information). If you have questions about a medical condition or this instruction, always ask your healthcare professional. [address] any warranty or liability for your use of this information. Bronchitis: Care Instructions  Your Care Instructions    Bronchitis is inflammation of the bronchial tubes, which carry air to the lungs. The tubes swell and produce mucus, or phlegm. The mucus and inflamed bronchial tubes make you cough. You may have trouble breathing. Most cases of bronchitis are caused by viruses like those that cause colds. Antibiotics usually do not help and they may be harmful. Bronchitis usually develops rapidly and lasts about 2 to 3 weeks in otherwise healthy people. Follow-up care is a key part of your treatment and safety. Be sure to make and go to all appointments, and call your doctor if you are having problems. It's also a good idea to know your test results and keep a list of the medicines you take. How can you care for yourself at home? · Take all medicines exactly as prescribed. Call your doctor if you think you are having a problem with your medicine. · Get some extra rest.  · Take an over-the-counter pain medicine, such as acetaminophen (Tylenol), ibuprofen (Advil, Motrin), or naproxen (Aleve) to reduce fever and relieve body aches. Read and follow all instructions on the label. · Do not take two or more pain medicines at the same time unless the doctor told you to. Many pain medicines have acetaminophen, which is Tylenol. Too much acetaminophen (Tylenol) can be harmful.   · Take an over-the-counter cough medicine that contains dextromethorphan to help quiet a dry, hacking cough so that you can sleep. Avoid cough medicines that have more than one active ingredient. Read and follow all instructions on the label. · Breathe moist air from a humidifier, hot shower, or sink filled with hot water. The heat and moisture will thin mucus so you can cough it out. · Do not smoke. Smoking can make bronchitis worse. If you need help quitting, talk to your doctor about stop-smoking programs and medicines. These can increase your chances of quitting for good. When should you call for help? Call 911 anytime you think you may need emergency care. For example, call if:  ? · You have severe trouble breathing. ?Call your doctor now or seek immediate medical care if:  ? · You have new or worse trouble breathing. ? · You cough up dark brown or bloody mucus (sputum). ? · You have a new or higher fever. ? · You have a new rash. ? Watch closely for changes in your health, and be sure to contact your doctor if:  ? · You cough more deeply or more often, especially if you notice more mucus or a change in the color of your mucus. ? · You are not getting better as expected. Where can you learn more? Go to http://aury-cruz.info/. Enter H333 in the search box to learn more about \"Bronchitis: Care Instructions. \"  Current as of: May 12, 2017  Content Version: 11.4  © 2308-7092 [employer]. Care instructions adapted under license by [employer] (which disclaims liability or warranty for this information). If you have questions about a medical condition or this instruction, always ask your healthcare professional. [address] any warranty or liability for your use of this information.

## 2018-01-13 NOTE — ED TRIAGE NOTES
Pt reports having the flu x 2 weeks ago. Pt reports cough and SOB since having the flu but states the cough has gotten worse. Pt w/ hx of COPD. Sepsis Screening completed    (  )Patient meets SIRS criteria. ( X )Patient does not meet SIRS criteria.       SIRS Criteria is achieved when two or more of the following are present   Temperature < 96.8°F (36°C) or > 100.9°F (38.3°C)   Heart Rate > 90 beats per minute   Respiratory Rate > 20 breaths per minute   WBC count > 12,000 or <4,000 or > 10% bands

## 2018-01-20 LAB
ATRIAL RATE: 91 BPM
CALCULATED P AXIS, ECG09: 46 DEGREES
CALCULATED R AXIS, ECG10: 76 DEGREES
CALCULATED T AXIS, ECG11: 28 DEGREES
DIAGNOSIS, 93000: NORMAL
P-R INTERVAL, ECG05: 140 MS
Q-T INTERVAL, ECG07: 370 MS
QRS DURATION, ECG06: 92 MS
QTC CALCULATION (BEZET), ECG08: 455 MS
VENTRICULAR RATE, ECG03: 91 BPM

## 2018-09-20 ENCOUNTER — HOSPITAL ENCOUNTER (INPATIENT)
Age: 43
LOS: 1 days | Discharge: HOME | DRG: 103 | End: 2018-09-21
Attending: EMERGENCY MEDICINE | Admitting: HOSPITALIST
Payer: COMMERCIAL

## 2018-09-20 ENCOUNTER — APPOINTMENT (OUTPATIENT)
Dept: CT IMAGING | Age: 43
DRG: 103 | End: 2018-09-20
Attending: HOSPITALIST
Payer: COMMERCIAL

## 2018-09-20 ENCOUNTER — APPOINTMENT (OUTPATIENT)
Dept: CT IMAGING | Age: 43
DRG: 103 | End: 2018-09-20
Attending: EMERGENCY MEDICINE
Payer: COMMERCIAL

## 2018-09-20 DIAGNOSIS — I69.322 CVA, OLD, DYSARTHRIA: Primary | ICD-10-CM

## 2018-09-20 PROBLEM — K58.9 IBS (IRRITABLE BOWEL SYNDROME): Status: ACTIVE | Noted: 2018-09-20

## 2018-09-20 PROBLEM — J44.9 CHRONIC OBSTRUCTIVE PULMONARY DISEASE (HCC): Status: ACTIVE | Noted: 2018-09-20

## 2018-09-20 PROBLEM — F32.A DEPRESSION: Status: ACTIVE | Noted: 2018-09-20

## 2018-09-20 PROBLEM — I63.9 ACUTE CVA (CEREBROVASCULAR ACCIDENT) (HCC): Status: ACTIVE | Noted: 2018-09-20

## 2018-09-20 PROBLEM — F31.9 BIPOLAR AFFECTIVE (HCC): Status: ACTIVE | Noted: 2018-09-20

## 2018-09-20 PROBLEM — E03.9 HYPOTHYROIDISM: Status: ACTIVE | Noted: 2018-09-20

## 2018-09-20 PROBLEM — F17.200 SMOKER: Status: ACTIVE | Noted: 2018-09-20

## 2018-09-20 PROBLEM — G43.909 MIGRAINE: Status: ACTIVE | Noted: 2018-09-20

## 2018-09-20 LAB
ALBUMIN SERPL-MCNC: 3.8 G/DL (ref 3.4–5)
ALBUMIN/GLOB SERPL: 1.2 {RATIO} (ref 0.8–1.7)
ALP SERPL-CCNC: 76 U/L (ref 45–117)
ALT SERPL-CCNC: 17 U/L (ref 13–56)
AMPHET UR QL SCN: NEGATIVE
ANION GAP SERPL CALC-SCNC: 8 MMOL/L (ref 3–18)
APPEARANCE UR: CLEAR
AST SERPL-CCNC: 14 U/L (ref 15–37)
BARBITURATES UR QL SCN: POSITIVE
BASOPHILS # BLD: 0.1 K/UL (ref 0–0.1)
BASOPHILS NFR BLD: 1 % (ref 0–2)
BENZODIAZ UR QL: POSITIVE
BILIRUB SERPL-MCNC: 0.3 MG/DL (ref 0.2–1)
BILIRUB UR QL: NEGATIVE
BUN SERPL-MCNC: 7 MG/DL (ref 7–18)
BUN/CREAT SERPL: 8 (ref 12–20)
CALCIUM SERPL-MCNC: 8.3 MG/DL (ref 8.5–10.1)
CANNABINOIDS UR QL SCN: NEGATIVE
CHLORIDE SERPL-SCNC: 106 MMOL/L (ref 100–108)
CK MB CFR SERPL CALC: NORMAL % (ref 0–4)
CK MB SERPL-MCNC: <1 NG/ML (ref 5–25)
CK SERPL-CCNC: 77 U/L (ref 26–192)
CO2 SERPL-SCNC: 26 MMOL/L (ref 21–32)
COCAINE UR QL SCN: POSITIVE
COLOR UR: YELLOW
CREAT SERPL-MCNC: 0.93 MG/DL (ref 0.6–1.3)
DIFFERENTIAL METHOD BLD: ABNORMAL
EOSINOPHIL # BLD: 0.5 K/UL (ref 0–0.4)
EOSINOPHIL NFR BLD: 6 % (ref 0–5)
ERYTHROCYTE [DISTWIDTH] IN BLOOD BY AUTOMATED COUNT: 13.6 % (ref 11.6–14.5)
GLOBULIN SER CALC-MCNC: 3.3 G/DL (ref 2–4)
GLUCOSE SERPL-MCNC: 75 MG/DL (ref 74–99)
GLUCOSE UR STRIP.AUTO-MCNC: NEGATIVE MG/DL
HCT VFR BLD AUTO: 36.6 % (ref 35–45)
HDSCOM,HDSCOM: ABNORMAL
HGB BLD-MCNC: 12 G/DL (ref 12–16)
HGB UR QL STRIP: NEGATIVE
KETONES UR QL STRIP.AUTO: NEGATIVE MG/DL
LEUKOCYTE ESTERASE UR QL STRIP.AUTO: NEGATIVE
LYMPHOCYTES # BLD: 3 K/UL (ref 0.9–3.6)
LYMPHOCYTES NFR BLD: 36 % (ref 21–52)
MCH RBC QN AUTO: 29.3 PG (ref 24–34)
MCHC RBC AUTO-ENTMCNC: 32.8 G/DL (ref 31–37)
MCV RBC AUTO: 89.3 FL (ref 74–97)
METHADONE UR QL: NEGATIVE
MONOCYTES # BLD: 0.5 K/UL (ref 0.05–1.2)
MONOCYTES NFR BLD: 6 % (ref 3–10)
NEUTS SEG # BLD: 4.2 K/UL (ref 1.8–8)
NEUTS SEG NFR BLD: 51 % (ref 40–73)
NITRITE UR QL STRIP.AUTO: NEGATIVE
OPIATES UR QL: NEGATIVE
PCP UR QL: NEGATIVE
PH UR STRIP: 5.5 [PH] (ref 5–8)
PLATELET # BLD AUTO: 261 K/UL (ref 135–420)
PMV BLD AUTO: 10.6 FL (ref 9.2–11.8)
POTASSIUM SERPL-SCNC: 3.8 MMOL/L (ref 3.5–5.5)
PROT SERPL-MCNC: 7.1 G/DL (ref 6.4–8.2)
PROT UR STRIP-MCNC: NEGATIVE MG/DL
RBC # BLD AUTO: 4.1 M/UL (ref 4.2–5.3)
SODIUM SERPL-SCNC: 140 MMOL/L (ref 136–145)
SP GR UR REFRACTOMETRY: 1.01 (ref 1–1.03)
TROPONIN I SERPL-MCNC: <0.02 NG/ML (ref 0–0.06)
UROBILINOGEN UR QL STRIP.AUTO: 0.2 EU/DL (ref 0.2–1)
WBC # BLD AUTO: 8.3 K/UL (ref 4.6–13.2)

## 2018-09-20 PROCEDURE — 74011250636 HC RX REV CODE- 250/636: Performed by: HOSPITALIST

## 2018-09-20 PROCEDURE — 81241 F5 GENE: CPT | Performed by: HOSPITALIST

## 2018-09-20 PROCEDURE — 74011250637 HC RX REV CODE- 250/637: Performed by: EMERGENCY MEDICINE

## 2018-09-20 PROCEDURE — 81003 URINALYSIS AUTO W/O SCOPE: CPT | Performed by: EMERGENCY MEDICINE

## 2018-09-20 PROCEDURE — 74011636320 HC RX REV CODE- 636/320: Performed by: EMERGENCY MEDICINE

## 2018-09-20 PROCEDURE — 93005 ELECTROCARDIOGRAM TRACING: CPT

## 2018-09-20 PROCEDURE — 74011250637 HC RX REV CODE- 250/637: Performed by: HOSPITALIST

## 2018-09-20 PROCEDURE — 80307 DRUG TEST PRSMV CHEM ANLYZR: CPT | Performed by: EMERGENCY MEDICINE

## 2018-09-20 PROCEDURE — 80053 COMPREHEN METABOLIC PANEL: CPT | Performed by: EMERGENCY MEDICINE

## 2018-09-20 PROCEDURE — 65660000000 HC RM CCU STEPDOWN

## 2018-09-20 PROCEDURE — 96375 TX/PRO/DX INJ NEW DRUG ADDON: CPT

## 2018-09-20 PROCEDURE — 85025 COMPLETE CBC W/AUTO DIFF WBC: CPT | Performed by: EMERGENCY MEDICINE

## 2018-09-20 PROCEDURE — 96374 THER/PROPH/DIAG INJ IV PUSH: CPT

## 2018-09-20 PROCEDURE — 99285 EMERGENCY DEPT VISIT HI MDM: CPT

## 2018-09-20 PROCEDURE — 70496 CT ANGIOGRAPHY HEAD: CPT

## 2018-09-20 PROCEDURE — 70450 CT HEAD/BRAIN W/O DYE: CPT

## 2018-09-20 PROCEDURE — 74011250636 HC RX REV CODE- 250/636: Performed by: EMERGENCY MEDICINE

## 2018-09-20 PROCEDURE — 96361 HYDRATE IV INFUSION ADD-ON: CPT

## 2018-09-20 PROCEDURE — 84484 ASSAY OF TROPONIN QUANT: CPT | Performed by: EMERGENCY MEDICINE

## 2018-09-20 RX ORDER — SODIUM CHLORIDE 9 MG/ML
100 INJECTION, SOLUTION INTRAVENOUS CONTINUOUS
Status: DISCONTINUED | OUTPATIENT
Start: 2018-09-20 | End: 2018-09-21 | Stop reason: HOSPADM

## 2018-09-20 RX ORDER — ZOLPIDEM TARTRATE 5 MG/1
5 TABLET ORAL
Status: DISCONTINUED | OUTPATIENT
Start: 2018-09-20 | End: 2018-09-21 | Stop reason: HOSPADM

## 2018-09-20 RX ORDER — LEVOTHYROXINE SODIUM 75 UG/1
150 TABLET ORAL
Status: DISCONTINUED | OUTPATIENT
Start: 2018-09-21 | End: 2018-09-21 | Stop reason: HOSPADM

## 2018-09-20 RX ORDER — TOPIRAMATE 100 MG/1
TABLET, FILM COATED ORAL
COMMUNITY
End: 2018-09-21

## 2018-09-20 RX ORDER — ZIPRASIDONE HYDROCHLORIDE 20 MG/1
120 CAPSULE ORAL DAILY
Status: DISCONTINUED | OUTPATIENT
Start: 2018-09-20 | End: 2018-09-21 | Stop reason: HOSPADM

## 2018-09-20 RX ORDER — LABETALOL HYDROCHLORIDE 5 MG/ML
5 INJECTION, SOLUTION INTRAVENOUS
Status: DISCONTINUED | OUTPATIENT
Start: 2018-09-20 | End: 2018-09-21 | Stop reason: HOSPADM

## 2018-09-20 RX ORDER — IBUPROFEN 200 MG
1 TABLET ORAL DAILY
Status: DISCONTINUED | OUTPATIENT
Start: 2018-09-21 | End: 2018-09-21 | Stop reason: HOSPADM

## 2018-09-20 RX ORDER — SODIUM CHLORIDE 0.9 % (FLUSH) 0.9 %
5-10 SYRINGE (ML) INJECTION AS NEEDED
Status: DISCONTINUED | OUTPATIENT
Start: 2018-09-20 | End: 2018-09-21 | Stop reason: HOSPADM

## 2018-09-20 RX ORDER — BUTALBITAL, ACETAMINOPHEN AND CAFFEINE 50; 325; 40 MG/1; MG/1; MG/1
2 TABLET ORAL
Status: COMPLETED | OUTPATIENT
Start: 2018-09-20 | End: 2018-09-20

## 2018-09-20 RX ORDER — CLONAZEPAM 0.5 MG/1
1 TABLET ORAL 2 TIMES DAILY
Status: DISCONTINUED | OUTPATIENT
Start: 2018-09-20 | End: 2018-09-21 | Stop reason: HOSPADM

## 2018-09-20 RX ORDER — POLYETHYLENE GLYCOL 3350 17 G/17G
17 POWDER, FOR SOLUTION ORAL DAILY
Status: DISCONTINUED | OUTPATIENT
Start: 2018-09-21 | End: 2018-09-20

## 2018-09-20 RX ORDER — ASPIRIN 325 MG
325 TABLET ORAL
Status: COMPLETED | OUTPATIENT
Start: 2018-09-20 | End: 2018-09-20

## 2018-09-20 RX ORDER — ATORVASTATIN CALCIUM 20 MG/1
20 TABLET, FILM COATED ORAL DAILY
Status: DISCONTINUED | OUTPATIENT
Start: 2018-09-20 | End: 2018-09-21 | Stop reason: HOSPADM

## 2018-09-20 RX ORDER — HYDROCODONE BITARTRATE AND ACETAMINOPHEN 5; 325 MG/1; MG/1
1 TABLET ORAL
Status: DISCONTINUED | OUTPATIENT
Start: 2018-09-20 | End: 2018-09-21 | Stop reason: HOSPADM

## 2018-09-20 RX ORDER — TOPIRAMATE 100 MG/1
100 TABLET, FILM COATED ORAL 2 TIMES DAILY
Status: DISCONTINUED | OUTPATIENT
Start: 2018-09-20 | End: 2018-09-21

## 2018-09-20 RX ORDER — LOPERAMIDE HYDROCHLORIDE 2 MG/1
2 CAPSULE ORAL
Status: DISCONTINUED | OUTPATIENT
Start: 2018-09-20 | End: 2018-09-21 | Stop reason: HOSPADM

## 2018-09-20 RX ORDER — ACETAMINOPHEN 325 MG/1
650 TABLET ORAL
Status: DISCONTINUED | OUTPATIENT
Start: 2018-09-20 | End: 2018-09-21 | Stop reason: HOSPADM

## 2018-09-20 RX ORDER — FLUTICASONE PROPIONATE 50 MCG
1 SPRAY, SUSPENSION (ML) NASAL DAILY
Status: DISCONTINUED | OUTPATIENT
Start: 2018-09-21 | End: 2018-09-21 | Stop reason: HOSPADM

## 2018-09-20 RX ORDER — PANTOPRAZOLE SODIUM 40 MG/1
40 TABLET, DELAYED RELEASE ORAL DAILY
Status: DISCONTINUED | OUTPATIENT
Start: 2018-09-21 | End: 2018-09-21 | Stop reason: HOSPADM

## 2018-09-20 RX ORDER — SERTRALINE HYDROCHLORIDE 50 MG/1
100 TABLET, FILM COATED ORAL DAILY
Status: DISCONTINUED | OUTPATIENT
Start: 2018-09-21 | End: 2018-09-21 | Stop reason: HOSPADM

## 2018-09-20 RX ORDER — GUAIFENESIN 100 MG/5ML
81 LIQUID (ML) ORAL DAILY
Status: DISCONTINUED | OUTPATIENT
Start: 2018-09-21 | End: 2018-09-21 | Stop reason: HOSPADM

## 2018-09-20 RX ORDER — KETOROLAC TROMETHAMINE 30 MG/ML
30 INJECTION, SOLUTION INTRAMUSCULAR; INTRAVENOUS
Status: COMPLETED | OUTPATIENT
Start: 2018-09-20 | End: 2018-09-20

## 2018-09-20 RX ORDER — PROMETHAZINE HYDROCHLORIDE 25 MG/1
25 TABLET ORAL
COMMUNITY
End: 2018-09-21

## 2018-09-20 RX ORDER — SODIUM CHLORIDE 0.9 % (FLUSH) 0.9 %
5-10 SYRINGE (ML) INJECTION EVERY 8 HOURS
Status: DISCONTINUED | OUTPATIENT
Start: 2018-09-20 | End: 2018-09-21 | Stop reason: HOSPADM

## 2018-09-20 RX ORDER — DIPHENHYDRAMINE HYDROCHLORIDE 50 MG/ML
25 INJECTION, SOLUTION INTRAMUSCULAR; INTRAVENOUS ONCE
Status: COMPLETED | OUTPATIENT
Start: 2018-09-20 | End: 2018-09-20

## 2018-09-20 RX ORDER — LORAZEPAM 2 MG/ML
0.5 INJECTION INTRAMUSCULAR
Status: COMPLETED | OUTPATIENT
Start: 2018-09-20 | End: 2018-09-20

## 2018-09-20 RX ORDER — ZIPRASIDONE HYDROCHLORIDE 20 MG/1
60 CAPSULE ORAL DAILY
Status: DISCONTINUED | OUTPATIENT
Start: 2018-09-21 | End: 2018-09-20

## 2018-09-20 RX ADMIN — DIPHENHYDRAMINE HYDROCHLORIDE 25 MG: 50 INJECTION, SOLUTION INTRAMUSCULAR; INTRAVENOUS at 20:08

## 2018-09-20 RX ADMIN — SODIUM CHLORIDE 100 ML/HR: 900 INJECTION, SOLUTION INTRAVENOUS at 23:15

## 2018-09-20 RX ADMIN — ASPIRIN 325 MG ORAL TABLET 325 MG: 325 PILL ORAL at 21:17

## 2018-09-20 RX ADMIN — HYDROCODONE BITARTRATE AND ACETAMINOPHEN 1 TABLET: 5; 325 TABLET ORAL at 23:24

## 2018-09-20 RX ADMIN — KETOROLAC TROMETHAMINE 30 MG: 30 INJECTION, SOLUTION INTRAMUSCULAR at 20:09

## 2018-09-20 RX ADMIN — METHYLPREDNISOLONE SODIUM SUCCINATE 125 MG: 125 INJECTION, POWDER, FOR SOLUTION INTRAMUSCULAR; INTRAVENOUS at 20:08

## 2018-09-20 RX ADMIN — Medication 10 ML: at 23:23

## 2018-09-20 RX ADMIN — IOPAMIDOL 100 ML: 755 INJECTION, SOLUTION INTRAVENOUS at 22:09

## 2018-09-20 RX ADMIN — TOPIRAMATE 100 MG: 25 TABLET, FILM COATED ORAL at 23:23

## 2018-09-20 RX ADMIN — ZOLPIDEM TARTRATE 5 MG: 5 TABLET ORAL at 21:55

## 2018-09-20 RX ADMIN — LORAZEPAM 0.5 MG: 2 INJECTION, SOLUTION INTRAMUSCULAR; INTRAVENOUS at 21:15

## 2018-09-20 RX ADMIN — ZIPRASIDONE HYDROCHLORIDE 120 MG: 20 CAPSULE ORAL at 23:23

## 2018-09-20 RX ADMIN — BUTALBITAL, ACETAMINOPHEN AND CAFFEINE 2 TABLET: 50; 325; 40 TABLET ORAL at 21:15

## 2018-09-20 RX ADMIN — SODIUM CHLORIDE 1000 ML: 900 INJECTION, SOLUTION INTRAVENOUS at 20:09

## 2018-09-20 RX ADMIN — CLONAZEPAM 1 MG: 0.5 TABLET ORAL at 23:23

## 2018-09-20 RX ADMIN — ATORVASTATIN CALCIUM 20 MG: 20 TABLET, FILM COATED ORAL at 23:23

## 2018-09-20 NOTE — ED TRIAGE NOTES
Pt reports to ED c/c HA onset Sunday, also reports slurred speech startetd yesterday, pt speech is clear at this time. PT talking slowly, is appropriate, alert and oriented X4. Dr. Olivas Artist in triage to evaluate patient. Pt reports hx of Anxiety, Bipolar Disorder, and Migraine HA.

## 2018-09-20 NOTE — ED PROVIDER NOTES
EMERGENCY DEPARTMENT HISTORY AND PHYSICAL EXAM 
 
Date: 9/20/2018 Patient Name: Jean-Paul Jhaveri History of Presenting Illness Chief Complaint Patient presents with  
 Headache  Dysarthria History Provided By: Patient Chief Complaint: Slurred speech Duration: 2 days Timing:  Acute Location: Neurological 
Quality: Slurred Associated Symptoms: HA x 1 days Additional History (Context):  
7:28 PM 
Jean-Paul Jhaveri is a 38 y.o. female with PMHx of migraines, bipolar disorder, and anxiety who presents to the emergency department C/O persistent slurred speech, onset yesterday. Associated sxs include HA x 4 days. Pt took Fioricet, Motrin (last dose 4 hours ago), and other migraine medications without relief of HA. States her HA is more diffuse and intense than her typical migraines. Pt has no H/O MRI. Endorses tobacco use. Notes FHx of CVA. Pt denies numbness, tingling, fever, chills, PMHx of CVA or any other sxs or complaints. PCP: Severiano Dearth, MD 
 
Current Outpatient Prescriptions Medication Sig Dispense Refill  clonazePAM (KLONOPIN) 1 mg tablet Take 1 mg by mouth two (2) times a day.  methylPREDNISolone (MEDROL, FATOU,) 4 mg tablet Take as package directs 1 Dose Pack 0  
 guaiFENesin-codeine (ROBITUSSIN AC) 100-10 mg/5 mL solution Take 7.5 mL by mouth three (3) times daily as needed for Cough. Max Daily Amount: 22.5 mL. 118 mL 0  
 BUTALB/ACETAMINOPHEN/CAFFEINE (FIORICET PO) Take  by mouth.  HYDROcodone-acetaminophen (NORCO) 5-325 mg per tablet Take 1 Tab by mouth every four (4) hours as needed for Pain. 20 Tab 0  
 fluticasone (FLONASE) 50 mcg/actuation nasal spray 1 [place] by Both Nostrils route daily. 1 spray in each nostril. 1 Bottle 0  
 sertraline (ZOLOFT) 100 mg tablet Take  by mouth daily.  levothyroxine (SYNTHROID) 150 mcg tablet Take  by mouth Daily (before breakfast).  esomeprazole (NEXIUM) 40 mg capsule Take  by mouth daily.  ziprasidone (GEODON) 40 mg capsule Take 60 mg by mouth daily.  zolpidem (AMBIEN) 10 mg tablet Take  by mouth nightly as needed. Past History Past Medical History: 
Past Medical History:  
Diagnosis Date  Bipolar affective ([address]  Chronic obstructive pulmonary disease ([facility] [address]  Depression  High cholesterol  Hypothyroidism  IBS (irritable bowel syndrome)  Migraine  Other ill-defined conditions(799.89) frequent UTI's  UTI (urinary tract infection) Past Surgical History: 
Past Surgical History:  
Procedure Laterality Date  HX CHOLECYSTECTOMY  HX GYN    
 tubal ligation  HX HEENT    
 tonsillectomy Family History: 
History reviewed. No pertinent family history. Social History: 
Social History Substance Use Topics  Smoking status: Current Every Day Smoker Packs/day: 0.50  Smokeless tobacco: Never Used  Alcohol use Yes Comment: rarely Allergies: Allergies Allergen Reactions  Sulfa (Sulfonamide Antibiotics) Hives  Imitrex [Sumatriptan Succinate] Other (comments) \"makes headaches worse\" Review of Systems Review of Systems Constitutional: Negative for chills and fever. Neurological: Positive for speech difficulty and headaches. Negative for numbness. All other systems reviewed and are negative. Physical Exam  
 
Vitals:  
 09/20/18 1933 09/20/18 2000 BP: 135/60 118/76 Pulse: 72 61 Resp: 16 16 Temp: 98.3 °F (36.8 °C) SpO2:  100% Weight: 81.6 kg (180 lb) Height: 5' 3\" (1.6 m) Physical Exam  
Constitutional: She is oriented to person, place, and time. She appears well-developed and well-nourished. No distress. HENT:  
Head: Normocephalic and atraumatic. Eyes: Pupils are equal, round, and reactive to light. Neck: Neck supple. Cardiovascular: Normal rate, regular rhythm, S1 normal, S2 normal and normal heart sounds. Pulmonary/Chest: Breath sounds normal. No respiratory distress. She has no wheezes. She has no rales. She exhibits no tenderness. Abdominal: Soft. She exhibits no distension and no mass. There is no tenderness. There is no guarding. Musculoskeletal: Normal range of motion. She exhibits no edema or tenderness. Neurological: She is alert and oriented to person, place, and time. No cranial nerve deficit. Skin: No rash noted. Psychiatric: She has a normal mood and affect. Her behavior is normal. Thought content normal.  
Nursing note and vitals reviewed. Diagnostic Study Results Labs - Recent Results (from the past 12 hour(s)) URINALYSIS W/ RFLX MICROSCOPIC Collection Time: 09/20/18  7:45 PM  
Result Value Ref Range Color YELLOW Appearance CLEAR Specific gravity 1.009 1.005 - 1.030    
 pH (UA) 5.5 5.0 - 8.0 Protein NEGATIVE  NEG mg/dL Glucose NEGATIVE  NEG mg/dL Ketone NEGATIVE  NEG mg/dL Bilirubin NEGATIVE  NEG Blood NEGATIVE  NEG Urobilinogen 0.2 0.2 - 1.0 EU/dL Nitrites NEGATIVE  NEG Leukocyte Esterase NEGATIVE  NEG    
DRUG SCREEN, URINE Collection Time: 09/20/18  7:45 PM  
Result Value Ref Range BENZODIAZEPINES POSITIVE (A) NEG    
 BARBITURATES POSITIVE (A) NEG    
 THC (TH-CANNABINOL) NEGATIVE  NEG    
 OPIATES NEGATIVE  NEG    
 PCP(PHENCYCLIDINE) NEGATIVE  NEG    
 COCAINE POSITIVE (A) NEG    
 AMPHETAMINES NEGATIVE  NEG METHADONE NEGATIVE  NEG HDSCOM (NOTE) EKG, 12 LEAD, INITIAL Collection Time: 09/20/18  7:51 PM  
Result Value Ref Range Ventricular Rate 51 BPM  
 Atrial Rate 51 BPM  
 P-R Interval 146 ms  
 QRS Duration 88 ms Q-T Interval 482 ms QTC Calculation (Bezet) 444 ms Calculated P Axis 45 degrees Calculated R Axis 70 degrees Calculated T Axis 57 degrees Diagnosis Sinus bradycardia Otherwise normal ECG When compared with ECG of 13-JAN-2018 10:51, 
 Vent. rate has decreased BY  40 BPM 
Nonspecific T wave abnormality no longer evident in Anterior leads CBC WITH AUTOMATED DIFF Collection Time: 09/20/18  7:55 PM  
Result Value Ref Range WBC 8.3 4.6 - 13.2 K/uL  
 RBC 4.10 (L) 4.20 - 5.30 M/uL  
 HGB 12.0 12.0 - 16.0 g/dL HCT 36.6 35.0 - 45.0 % MCV 89.3 74.0 - 97.0 FL  
 MCH 29.3 24.0 - 34.0 PG  
 MCHC 32.8 31.0 - 37.0 g/dL  
 RDW 13.6 11.6 - 14.5 % PLATELET 947 048 - 685 K/uL MPV 10.6 9.2 - 11.8 FL  
 NEUTROPHILS 51 40 - 73 % LYMPHOCYTES 36 21 - 52 % MONOCYTES 6 3 - 10 % EOSINOPHILS 6 (H) 0 - 5 % BASOPHILS 1 0 - 2 %  
 ABS. NEUTROPHILS 4.2 1.8 - 8.0 K/UL  
 ABS. LYMPHOCYTES 3.0 0.9 - 3.6 K/UL  
 ABS. MONOCYTES 0.5 0.05 - 1.2 K/UL  
 ABS. EOSINOPHILS 0.5 (H) 0.0 - 0.4 K/UL  
 ABS. BASOPHILS 0.1 0.0 - 0.1 K/UL  
 DF AUTOMATED METABOLIC PANEL, COMPREHENSIVE Collection Time: 09/20/18  7:55 PM  
Result Value Ref Range Sodium 140 136 - 145 mmol/L Potassium 3.8 3.5 - 5.5 mmol/L Chloride 106 100 - 108 mmol/L  
 CO2 26 21 - 32 mmol/L Anion gap 8 3.0 - 18 mmol/L Glucose 75 74 - 99 mg/dL BUN 7 7.0 - 18 MG/DL Creatinine 0.93 0.6 - 1.3 MG/DL  
 BUN/Creatinine ratio 8 (L) 12 - 20 GFR est AA >60 >60 ml/min/1.73m2 GFR est non-AA >60 >60 ml/min/1.73m2 Calcium 8.3 (L) 8.5 - 10.1 MG/DL Bilirubin, total 0.3 0.2 - 1.0 MG/DL  
 ALT (SGPT) 17 13 - 56 U/L  
 AST (SGOT) 14 (L) 15 - 37 U/L Alk. phosphatase 76 45 - 117 U/L Protein, total 7.1 6.4 - 8.2 g/dL Albumin 3.8 3.4 - 5.0 g/dL Globulin 3.3 2.0 - 4.0 g/dL A-G Ratio 1.2 0.8 - 1.7 CARDIAC PANEL,(CK, CKMB & TROPONIN) Collection Time: 09/20/18  7:55 PM  
Result Value Ref Range CK 77 26 - 192 U/L  
 CK - MB <1.0 <3.6 ng/ml CK-MB Index  0.0 - 4.0 % CALCULATION NOT PERFORMED WHEN RESULT IS BELOW LINEAR LIMIT Troponin-I, Qt. <0.02 0.00 - 0.06 NG/ML Radiologic Studies -   
CT Results  (Last 48 hours) 09/20/18 2041  CT HEAD WO CONT Final result Impression:  IMPRESSION:  
   
No midline shift, mass effect or acute hemorrhage Subtle low-attenuation area in the left thalamus suggesting possible subacute to  
acute type infarct. Image 12. Narrative:  EXAM: CT head INDICATION: Slurred speech and headache COMPARISON: None. TECHNIQUE: Axial CT imaging of the head was performed without intravenous  
contrast. One or more dose reduction techniques were used on this CT: automated  
exposure control, adjustment of the Ams and/or kVp according to patient size,  
and iterative reconstruction techniques. The specific techniques used on this CT exam have been documented in the patient's electronic medical record.  
   
_______________ FINDINGS:  
   
BRAIN AND POSTERIOR FOSSA: The sulci, folia, ventricles and basal cisterns are  
within normal limits for the patient's age. There is no intracranial hemorrhage,  
mass effect, or midline shift. There is a subtle low-attenuation area in the  
left thalamus possibly representing subacute to acute type infarct. EXTRA-AXIAL SPACES AND MENINGES: There are no abnormal extra-axial fluid  
collections. CALVARIUM: Intact. SINUSES: Is mucoperiosteal thickening of the ethmoid sinuses suggesting chronic  
paranasal sinus disease. [place] o[place] OTHER: None.  
   
_______________ CXR Results  (Last 48 hours) None Medical Decision Making I am the first provider for this patient. I reviewed the vital signs, available nursing notes, past medical history, past surgical history, family history and social history. Vital Signs-Reviewed the patient's vital signs. Pulse Oximetry Analysis - 100% on RA Cardiac Monitor: 
Rate: 56 bpm 
Rhythm: Sinus bradycardia EKG interpretation: (Preliminary) 7:51 PM 
Sinus bradycardia at 51 bpm. QRS at 88 ms.  
EKG read by tSeve Esquivel MD at 7:51 PM  
 
 Records Reviewed: Nursing Notes and Old Medical Records Provider Notes (Medical Decision Making):  
INITIAL CLINICAL IMPRESSION and PLANS: 
The patient presents with the primary complaint(s) of: HA and dysarthria. The presentation, to include historical aspects and clinical findings are consistent with the DX of complex migraine. However, other possible DX's to consider as primary, associated with, or exacerbated by include: 
 
1. TIA 2. Tumor 3. Medication reaction 4. Encephalopathy 5. Brain bleed Recorded by Cindi Dixon, ED Scribe, as dictated by Steve Esquivel MD. 
 
Procedures: 
Procedures MEDICATIONS GIVEN: 
Medications  
sodium chloride 0.9 % bolus infusion 1,000 mL (0 mL IntraVENous IV Completed 9/20/18 2120)  
ketorolac (TORADOL) injection 30 mg (30 mg IntraVENous Given 9/20/18 2009) diphenhydrAMINE (BENADRYL) injection 25 mg (25 mg IntraVENous Given 9/20/18 2008) methylPREDNISolone (PF) (SOLU-MEDROL) injection 125 mg (125 mg IntraVENous Given 9/20/18 2008) LORazepam (ATIVAN) injection 0.5 mg (0.5 mg IntraVENous Given 9/20/18 2115) butalbital-acetaminophen-caffeine (FIORICET, ESGIC) -40 mg per tablet 2 Tab (2 Tabs Oral Given 9/20/18 2115) aspirin (ASPIRIN) tablet 325 mg (325 mg Oral Given 9/20/18 2117) ED Course:  
7:28 PM  
Initial assessment performed. The patients presenting problems have been discussed, and they are in agreement with the care plan formulated and outlined with them. I have encouraged them to ask questions as they arise throughout their visit. 9:19 PM Discussed patient's history, exam, and available diagnostics results with Vickie Hong MD, internal medicine, who agrees with admission to telemetry. Diagnosis and Disposition Critical Care Time: None Core Measures: 
For Hospitalized Patients: 
 
1. Hospitalization Decision Time: The decision to hospitalize the patient was made by [employer], MD at 9:19 PM on 9/20/2018 2. Aspirin: Aspirin was given 9:19 PM  Patient is being admitted to the hospital by Neema Escobedo MD. The results of their tests and reasons for their admission have been discussed with them and/or available family. They convey agreement and understanding for the need to be admitted and for their admission diagnosis. CONDITIONS ON ADMISSION: 
Sepsis is not present at the time of admission. Deep Vein Thrombosis is not present at the time of admission. Thrombosis is not present at the time of admission. Urinary Tract Infection is not present at the time of admission. Pneumonia is not present at the time of admission. MRSA is not present at the time of admission. Wound infection is not present at the time of admission. Pressure Ulcer is not present at the time of admission. CLINICAL IMPRESSION: 
 
1. CVA, old, dysarthria PLAN: 
1. Admit to telemetry 
_______________________________ Attestations: This note is prepared by Lex Juarez, acting as Scribe for Valente Feliciano MD. Valente Feliciano MD:  The scribe's documentation has been prepared under my direction and personally reviewed by me in its entirety. I confirm that the note above accurately reflects all work, treatment, procedures, and medical decision making performed by me. 
_______________________________

## 2018-09-20 NOTE — IP AVS SNAPSHOT
[address] [address] 
[phone] Patient: Dara Calderon MRN: [id number] :1975 A check jimenez indicates which time of day the medication should be taken. My Medications START taking these medications Instructions Each Dose to Equal  
 Morning Noon Evening Bedtime  
 aspirin 81 mg chewable tablet Start taking on:  2018 Your last dose was: Your next dose is: Take 1 Tab by mouth daily. 81 mg  
    
   
   
   
  
 atorvastatin 20 mg tablet Commonly known as:  LIPITOR Start taking on:  2018 Your last dose was: Your next dose is: Take 1 Tab by mouth daily. 20 mg CONTINUE taking these medications Instructions Each Dose to Equal  
 Morning Noon Evening Bedtime AMBIEN 10 mg tablet Generic drug:  zolpidem Your last dose was: Your next dose is: Take  by mouth nightly as needed. fluticasone 50 mcg/actuation nasal spray Commonly known as:  Dawna Shamjen Your last dose was: Your next dose is:    
   
   
 1 Spray by Both Nostrils route daily. 1 spray in each nostril. 1 Spray GEODON 40 mg capsule Generic drug:  ziprasidone Your last dose was: Your next dose is: Take 120 mg by mouth nightly. Indications: BIPOLAR DISORDER IN REMISSION  
 120 mg HYDROcodone-acetaminophen 5-325 mg per tablet Commonly known as:  Gina Lisa Your last dose was: Your next dose is: Take 1 Tab by mouth every four (4) hours as needed for Pain. 1 Tab KlonoPIN 1 mg tablet Generic drug:  clonazePAM  
   
Your last dose was: Your next dose is: Take 1 mg by mouth two (2) times a day. 1 mg NexIUM 40 mg capsule Generic drug:  esomeprazole Your last dose was: Your next dose is: Take  by mouth daily. SYNTHROID 150 mcg tablet Generic drug:  levothyroxine Your last dose was: Your next dose is: Take  by mouth Daily (before breakfast). ZOLOFT 100 mg tablet Generic drug:  sertraline Your last dose was: Your next dose is: Take 200 mg by mouth daily. 200 mg  
    
   
   
   
  
  
STOP taking these medications FIORICET PO  
   
  
 guaiFENesin-codeine 100-10 mg/5 mL solution Commonly known as:  ROBITUSSIN AC  
   
  
 methylPREDNISolone 4 mg tablet Commonly known as:  MEDROL (FATOU)  
   
  
 promethazine 25 mg tablet Commonly known as:  PHENERGAN  
   
  
 TOPAMAX 100 mg tablet Generic drug:  topiramate ASK your doctor about these medications Instructions Each Dose to Equal  
 Morning Noon Evening Bedtime  
 codeine-butalbital-acetaminophen-caffeine -76-30 mg capsule Commonly known as:  FIORICET WITH CODEINE Your last dose was: Your next dose is: Take 2 Caps by mouth every four (4) hours as needed for Headache. 2 Cap Where to Get Your Medications Information on where to get these meds will be given to you by the nurse or doctor. ! Ask your nurse or doctor about these medications  
  aspirin 81 mg chewable tablet  
 atorvastatin 20 mg tablet

## 2018-09-20 NOTE — IP AVS SNAPSHOT
[address] [address] [place] [address] 
[phone] Patient: Robel Ramon MRN: [id number] :1975 About your hospitalization You were admitted on:  2018 You last received care in the:  THE [facility] [address] You were discharged on:  2018 Why you were hospitalized Your primary diagnosis was:  Speech Abnormality Your diagnoses also included:  Cva, Old, Dysarthria, Migraine, Hypothyroidism, Depression, Chronic Obstructive Pulmonary Disease (Hcc), Bipolar Affective (Hcc), Acute Cva (Cerebrovascular Accident) (Hcc), Ibs (Irritable Bowel Syndrome), Smoker Follow-up Information Follow up With Details Comments Contact Info Eduin Escobedo MD Schedule an appointment as soon as possible for a visit in 1 week Hospital follow up and discontinuation of Topamax. [address] [facility] 
[phone] Roge Sarkar MD  Case Management spoke with physician's office. They will call you at home to schedule a follow-up appointment. [address] [facility] [address] [place] [address] 
[phone] Discharge Orders None A check jimenez indicates which time of day the medication should be taken. My Medications START taking these medications Instructions Each Dose to Equal  
 Morning Noon Evening Bedtime  
 aspirin 81 mg chewable tablet Start taking on:  2018 Your last dose was: Your next dose is: Take 1 Tab by mouth daily. 81 mg  
    
   
   
   
  
 atorvastatin 20 mg tablet Commonly known as:  LIPITOR Start taking on:  2018 Your last dose was: Your next dose is: Take 1 Tab by mouth daily. 20 mg CONTINUE taking these medications Instructions Each Dose to Equal  
 Morning Noon Evening Bedtime AMBIEN 10 mg tablet Generic drug:  zolpidem Your last dose was: Your next dose is: Take  by mouth nightly as needed. fluticasone 50 mcg/actuation nasal spray Commonly known as:  Allyssa Lyle Your last dose was: Your next dose is:    
   
   
 1 Spray by Both Nostrils route daily. 1 spray in each nostril. 1 Spray GEODON 40 mg capsule Generic drug:  ziprasidone Your last dose was: Your next dose is: Take 120 mg by mouth nightly. Indications: BIPOLAR DISORDER IN REMISSION  
 120 mg HYDROcodone-acetaminophen 5-325 mg per tablet Commonly known as:  Litzy Kind Your last dose was: Your next dose is: Take 1 Tab by mouth every four (4) hours as needed for Pain. 1 Tab KlonoPIN 1 mg tablet Generic drug:  clonazePAM  
   
Your last dose was: Your next dose is: Take 1 mg by mouth two (2) times a day. 1 mg NexIUM 40 mg capsule Generic drug:  esomeprazole Your last dose was: Your next dose is: Take  by mouth daily. SYNTHROID 150 mcg tablet Generic drug:  levothyroxine Your last dose was: Your next dose is: Take  by mouth Daily (before breakfast). ZOLOFT 100 mg tablet Generic drug:  sertraline Your last dose was: Your next dose is: Take 200 mg by mouth daily. 200 mg  
    
   
   
   
  
  
STOP taking these medications FIORICET PO  
   
  
 guaiFENesin-codeine 100-10 mg/5 mL solution Commonly known as:  ROBITUSSIN AC  
   
  
 methylPREDNISolone 4 mg tablet Commonly known as:  MEDROL (FATOU)  
   
  
 promethazine 25 mg tablet Commonly known as:  PHENERGAN  
   
  
 TOPAMAX 100 mg tablet Generic drug:  topiramate ASK your doctor about these medications Instructions Each Dose to Equal  
 Morning Noon Evening Bedtime  
 codeine-butalbital-acetaminophen-caffeine -99-30 mg capsule Commonly known as:  FIORICET WITH CODEINE Your last dose was: Your next dose is: Take 2 Caps by mouth every four (4) hours as needed for Headache. 2 Cap Where to Get Your Medications Information on where to get these meds will be given to you by the nurse or doctor. ! Ask your nurse or doctor about these medications  
  aspirin 81 mg chewable tablet  
 atorvastatin 20 mg tablet Opioid Education Prescription Opioids: What You Need to Know: 
 
 
 
F-face looks uneven A-arms unable to move or move unevenly S-speech slurred or non-existent T-time-call 911 as soon as signs and symptoms begin-DO NOT go Back to bed or wait to see if you get better-TIME IS BRAIN. Warning Signs of HEART ATTACK Call 911 if you have these symptoms: 
? Chest discomfort. Most heart attacks involve discomfort in the center of the chest that lasts more than a few minutes, or that goes away and comes back. It can feel like uncomfortable pressure, squeezing, fullness, or pain. ? Discomfort in other areas of the upper body. Symptoms can include pain or discomfort in one or both arms, the back, neck, jaw, or stomach. ? Shortness of breath with or without chest discomfort. ? Other signs may include breaking out in a cold sweat, nausea, or lightheadedness. Don't wait more than five minutes to call [address]! Fast action can save your life. Calling 911 is almost always the fastest way to get lifesaving treatment. Emergency Medical Services staff can begin treatment when they arrive  up to an hour sooner than if someone gets to the hospital by car. The discharge information has been reviewed with the patient. The patient verbalized understanding. Discharge medications reviewed with the patient and appropriate educational materials and side effects teaching were provided. Patient armband removed and shredded 9/21/2018 RE: Aishwarya Singh To Whom it May Concern: This is to certify that Aishwarya Singh may may return to work on [address] PCP. Please feel free to contact my office if you have any questions or concerns. Thank you for your assistance in this matter. Sincerely, 
 
 
Arun Crane RN Aspirin (By mouth) Aspirin (AS-pir-in) Treats pain, fever, and inflammation. May lower risk of heart attack and stroke. Brand Name(s): Ascriptin Regular Strength, Aspergum, Aspir Low, Aspirin Adult Low Dose, Aspirin Low Dose, Kaylin Aspirin Children's, Kaylin Aspirin Regimen, Kaylin Extra Strength, Kaylin Genuine Aspirin, Kaylin Low Dose, Bufferin, Bufferin Low Dose, Durlaza, Ecotrin, Ecpirin There may be other brand names for this medicine. When This Medicine Should Not Be Used: This medicine is not right for everyone. Do not use it if you had an allergic reaction to aspirin or other NSAIDs, or if you have a history of asthma with nasal polyps and rhinitis. How to Use This Medicine:  
Delayed Release Capsule, Long Acting Capsule, Gum, Tablet, Chewable Tablet, Fizzy Tablet, Coated Tablet, Long Acting Tablet, 24 Hour Capsule · Your doctor will tell you how much medicine to use. Do not use more than directed. · It is best to take this medicine with food or milk. · Capsule, tablet, or coated tablet: Swallow whole. Do not crush, break, or chew it. · Chewable tablet: You may chew it completely or swallow it whole. · Gum: Chew completely to make sure you get as much medicine as possible. Drink a full glass (8 ounces) of water after chewing the gum. · Swallow the extended-release capsule whole. Do not crush, break, or chew it. Take the capsule with a full glass of water at the same time each day. · Follow the instructions on the medicine label if you are using this medicine without a prescription. · Missed dose: If you miss a dose of Durlaza, skip the missed dose and go back to your regular dosing schedule. Do not take extra medicine to make up for a missed dose. · Store the medicine in a closed container at room temperature, away from heat, moisture, and direct light. Drugs and Foods to Avoid: Ask your doctor or pharmacist before using any other medicine, including over-the-counter medicines, vitamins, and herbal products. · Some foods and medicines can affect how aspirin works. Tell your doctor if you are using any of the following: ¨ Dipyridamole, methotrexate, probenecid, sulfinpyrazone, ticlopidine ¨ Blood thinner (including clopidogrel, prasugrel, ticagrelor, warfarin) ¨ Blood pressure medicine ¨ Medicine to treat seizures (including phenytoin, valproic acid) ¨ NSAID pain or arthritis medicine (including celecoxib, diclofenac, ibuprofen, naproxen) ¨ Steroid medicine (including dexamethasone, hydrocortisone, methylprednisolone, prednisolone, prednisone) · Do not take Durlaza 2 hours before or 1 hour after you drink alcohol or take medicines that contain alcohol. Warnings While Using This Medicine: · Tell your doctor if you are pregnant or breastfeeding. Do not use this medicine during the later part of a pregnancy unless your doctor tells you to. · Tell your doctor if you have kidney disease, liver disease, high blood pressure, heart disease, or a history of stomach bleeding or ulcers. · This medicine may increase your risk for bleeding, including stomach ulcers. · Do not give aspirin to a child or teenager who has chickenpox or flu symptoms, unless the doctor says it is okay. Aspirin can cause a life-threatening reaction called Reye syndrome. · Tell any doctor or dentist who treats you that you are using this medicine. This medicine may affect certain medical test results. · Keep all medicine out of the reach of children. Never share your medicine with anyone. Possible Side Effects While Using This Medicine:  
Call your doctor right away if you notice any of these side effects: · Allergic reaction: Itching or hives, swelling in your face or hands, swelling or tingling in your mouth or throat, chest tightness, trouble breathing · Bloody or black stools, bloody vomit or vomit that looks like coffee grounds · Chest tightness, wheezing · Ringing in the ears · Severe stomach pain · Unusual bleeding, bruising, or weakness If you notice other side effects that you think are caused by this medicine, tell your doctor. Call your doctor for medical advice about side effects. You may report side effects to FDA at 1-391-FDA-6789 © 2017 [facility] [place] Information is for End User's use only and may not be sold, redistributed or otherwise used for commercial purposes. The above information is an  only. It is not intended as medical advice for individual conditions or treatments. Talk to your doctor, nurse or pharmacist before following any medical regimen to see if it is safe and effective for you. Atorvastatin (By mouth) Atorvastatin (a-tor-va-STAT-in) Treats high cholesterol and triglyceride levels. Reduces the risk of angina, stroke, heart attack, or certain heart and blood vessel problems. This medicine is a statin. Brand Name(s): Lipitor There may be other brand names for this medicine. When This Medicine Should Not Be Used: This medicine is not right for everyone. Do not use it if you had an allergic reaction to atorvastatin, if you have active liver disease, or if you are pregnant or breastfeeding. How to Use This Medicine:  
Tablet · Take your medicine as directed. Your dose may need to be changed several times to find what works best for you. · Take this medicine at the same time each day. · Swallow the tablet whole. Do not break it. · Missed dose: Take a dose as soon as you remember. If it is less than 12 hours until your next dose, skip the missed dose and take the next dose at the regular time. Do not take 2 doses of this medicine within 12 hours. · Read and follow the patient instructions that come with this medicine. Talk to your doctor or pharmacist if you have any questions. · Store the medicine in a closed container at room temperature, away from heat, moisture, and direct light. Drugs and Foods to Avoid: Ask your doctor or pharmacist before using any other medicine, including over-the-counter medicines, vitamins, and herbal products. · Some medicines can affect how atorvastatin works. Tell your doctor if you also use birth control pills, boceprevir, cimetidine, colchicine, cyclosporine, digoxin, niacin, rifampin, spironolactone, telaprevir, medicine to treat an infection, or medicine to treat HIV/AIDS. Warnings While Using This Medicine: · It is not safe to take this medicine during pregnancy. It could harm an unborn baby. Tell your doctor right away if you become pregnant. · Tell your doctor if you have kidney disease, diabetes, muscle pain or weakness, thyroid problems, have recently had a stroke or TIA (transient ischemic attack), or have a history of liver disease. Tell your doctor if you drink grapefruit juice or drink alcohol regularly. · This medicine can cause muscle problems, which can lead to kidney problems. · Tell any doctor or dentist who treats you that you use this medicine. You may need to stop using it if you have surgery, have an injury, or develop serious health problems. · Your doctor will do lab tests at regular visits to check on the effects of this medicine. Keep all appointments. · Keep all medicine out of the reach of children. Never share your medicine with anyone. Possible Side Effects While Using This Medicine:  
Call your doctor right away if you notice any of these side effects: · Allergic reaction: Itching or hives, swelling in your face or hands, swelling or tingling in your mouth or throat, chest tightness, trouble breathing · Blistering, peeling, red skin rash · Change in how much or how often you urinate · Dark urine or pale stools, nausea, vomiting, loss of appetite, stomach pain, yellow skin or eyes · Fever · Muscle pain, tenderness, or weakness · Unusual tiredness If you notice these less serious side effects, talk with your doctor: · Diarrhea · Joint pain If you notice other side effects that you think are caused by this medicine, tell your doctor. Call your doctor for medical advice about side effects. You may report side effects to FDA at 6-159-FDA-1302 © 2017 [address] Information is for End User's use only and may not be sold, redistributed or otherwise used for commercial purposes. The above information is an  only. It is not intended as medical advice for individual conditions or treatments. Talk to your doctor, nurse or pharmacist before following any medical regimen to see if it is safe and effective for you. Influenza (Flu) Vaccine (Inactivated or Recombinant): What You Need to Know Why get vaccinated? Influenza (\"flu\") is a contagious disease that spreads around the [place] every winter, usually between October and May. Flu is caused by influenza viruses and is spread mainly by coughing, sneezing, and close contact. Anyone can get flu. Flu strikes suddenly and can last several days. Symptoms vary by age, but can include: · Fever/chills. · Sore throat. · Muscle aches. · Fatigue. · Cough. · Headache. · Runny or stuffy nose. Flu can also lead to pneumonia and blood infections, and cause diarrhea and seizures in children. If you have a medical condition, such as heart or lung disease, flu can make it worse. Flu is more dangerous for some people. Infants and young children, people 72years of age and older, pregnant women, and people with certain health conditions or a weakened immune system are at greatest risk. Each year thousands of people in the [facility] die from flu, and many more are hospitalized. Flu vaccine can: · Keep you from getting flu. · Make flu less severe if you do get it. · Keep you from spreading flu to your family and other people. Inactivated and recombinant flu vaccines A dose of flu vaccine is recommended every flu season. Children 6 months through 6years of age may need two doses during the same flu season. Everyone else needs only one dose each flu season. Some inactivated flu vaccines contain a very small amount of a mercury-based preservative called thimerosal. Studies have not shown thimerosal in vaccines to be harmful, but flu vaccines that do not contain thimerosal are available. There is no live flu virus in flu shots. They cannot cause the flu. There are many flu viruses, and they are always changing. Each year a new flu vaccine is made to protect against three or four viruses that are likely to cause disease in the upcoming flu season. But even when the vaccine doesn't exactly match these viruses, it may still provide some protection. Flu vaccine cannot prevent: · Flu that is caused by a virus not covered by the vaccine. · Illnesses that look like flu but are not. Some people should not get this vaccine Tell the person who is giving you the vaccine: · If you have any severe (life-threatening) allergies. If you ever had a life-threatening allergic reaction after a dose of flu vaccine, or have a severe allergy to any part of this vaccine, you may be advised not to get vaccinated. Most, but not all, types of flu vaccine contain a small amount of egg protein. · If you ever had Guillain-Barré syndrome (also called GBS) Some people with a history of GBS should not get this vaccine. This should be discussed with your doctor. · If you are not feeling well. It is usually okay to get flu vaccine when you have a mild illness, but you might be asked to come back when you feel better. Risks of a vaccine reaction With any medicine, including vaccines, there is a chance of reactions. These are usually mild and go away on their own, but serious reactions are also possible. Most people who get a flu shot do not have any problems with it. Minor problems following a flu shot include: · Soreness, redness, or swelling where the shot was given · Hoarseness · Sore, red or itchy eyes · Cough · Fever · Aches · Headache · Itching · Fatigue If these problems occur, they usually begin soon after the shot and last 1 or 2 days. More serious problems following a flu shot can include the following: · There may be a small increased risk of Guillain-Barré Syndrome (GBS) after inactivated flu vaccine. This risk has been estimated at 1 or 2 additional cases per million people vaccinated. This is much lower than the risk of severe complications from flu, which can be prevented by flu vaccine. · Zoë Shoulders children who get the flu shot along with pneumococcal vaccine (PCV13) and/or DTaP vaccine at the same time might be slightly more likely to have a seizure caused by fever. Ask your doctor for more information. Tell your doctor if a child who is getting flu vaccine has ever had a seizure Problems that could happen after any injected vaccine: · People sometimes faint after a medical procedure, including vaccination. Sitting or lying down for about 15 minutes can help prevent fainting, and injuries caused by a fall. Tell your doctor if you feel dizzy, or have vision changes or ringing in the ears. · Some people get severe pain in the shoulder and have difficulty moving the arm where a shot was given. This happens very rarely. · Any medication can cause a severe allergic reaction. Such reactions from a vaccine are very rare, estimated at about 1 in a million doses, and would happen within a few minutes to a few hours after the vaccination. As with any medicine, there is a very remote chance of a vaccine causing a serious injury or death. The safety of vaccines is always being monitored. For more information, visit: www.cdc.gov/vaccinesafety/. What if there is a serious reaction? What should I look for? · Look for anything that concerns you, such as signs of a severe allergic reaction, very high fever, or unusual behavior. Signs of a severe allergic reaction can include hives, swelling of the face and throat, difficulty breathing, a fast heartbeat, dizziness, and weakness - usually within a few minutes to a few hours after the vaccination. What should I do? · If you think it is a severe allergic reaction or other emergency that can't wait, call 9-1-1 and get the person to the nearest hospital. Otherwise, call your doctor. · Reactions should be reported to the \"Vaccine Adverse Event Reporting System\" (VAERS). Your doctor should file this report, or you can do it yourself through the VAERS website at www.vaers. hhs.gov, or by calling 5-219.223.3144. VAERS does not give medical advice.  
[facility] Vaccine Injury Compensation Program 
The National Vaccine Injury Compensation Program (VICP) is a federal program that was created to compensate people who may have been injured by certain vaccines. Persons who believe they may have been injured by a vaccine can learn about the program and about filing a claim by calling 8-835.842.1804 or visiting the [address] [employer] website at www.[facility].gov/vaccinecompensation. There is a time limit to file a claim for compensation. How can I learn more? · Ask your healthcare provider. He or she can give you the vaccine package insert or suggest other sources of information. · Call your local or state health department. · Contact the Centers for Disease Control and Prevention (CDC): 
¨ Call 7-379.389.6249 (1-800-CDC-INFO) or ¨ Visit CDC's website at www.cdc.gov/flu Vaccine Information Statement Inactivated Influenza Vaccine 8/7/2015) 42 TRACI Siddiqui 740HJ-32 [facility] [employer] Centers for Disease Control and Prevention Many Vaccine Information Statements are available in Sinhala and other languages. See www.immunize.org/vis. Muchas hojas de información sobre vacunas están disponibles en español y en otros idiomas. Visite www.immunize.org/vis. Care instructions adapted under license by [employer] (which disclaims liability or warranty for this information). If you have questions about a medical condition or this instruction, always ask your healthcare professional. [address] any warranty or liability for your use of this information. [employer] Announcement We are excited to announce that we are making your provider's discharge notes available to you in [employer]. You will see these notes when they are completed and signed by the physician that discharged you from your recent hospital stay. If you have any questions or concerns about any information you see in [employer], please call the Health Information Department where you were seen or reach out to your Primary Care Provider for more information about your plan of care. Introducing [facility]! Dear Argelia Nam: Thank you for requesting a [employer] account. Our records indicate that you already have an active [employer] account. You can access your account anytime at [email or web address] Did you know that you can access your hospital and ER discharge instructions at any time in [employer]? You can also review all of your test results from your hospital stay or ER visit. Additional Information If you have questions, please visit the Frequently Asked Questions section of the [employer] website at [email or web address] Remember, [employer] is NOT to be used for urgent needs. For medical emergencies, dial 911. Now available from your iPhone and Android! Introducing Bronson Eugene As a Janae Storey patient, I wanted to make you aware of our electronic visit tool called [facility]. [employer] 24/7 allows you to connect within minutes with a medical provider 24 hours a day, seven days a week via a mobile device or tablet or logging into a secure website from your computer. You can access Bronson Strattonfin from anywhere in the [place]. A virtual visit might be right for you when you have a simple condition and feel like you just dont want to get out of bed, or cant get away from work for an appointment, when your regular Janae Storey provider is not available (evenings, weekends or holidays), or when youre out of town and need minor care. Electronic visits cost only $49 and if the [employer] provider determines a prescription is needed to treat your condition, one can be electronically transmitted to a nearby pharmacy*. Please take a moment to enroll today if you have not already done so. The enrollment process is free and takes just a few minutes. To enroll, please download the [employer] 24/7 naty to your tablet or phone, or visit [email or web address] to enroll on your computer. And, as an [address] [place] [address] patient with a [employer] account, the results of your visits will be scanned into your electronic medical record and your primary care provider will be able to view the scanned results. We urge you to continue to see your regular Yulissa Stewart provider for your ongoing medical care. And while your primary care provider may not be the one available when you seek a [facility] virtual visit, the peace of mind you get from getting a real diagnosis real time can be priceless. For more information on [facility], view our Frequently Asked Questions (FAQs) at [email or web address]. Sincerely, 
 
Macarena Nolan MD 
Chief Medical Officer [address] *:  certain medications cannot be prescribed via [employer] Unresulted Labs-Please follow up with your PCP about these lab tests Order Current Status HYPERCOAGULATION PROFILE In process TSH 3RD GENERATION In process EKG, 12 LEAD, INITIAL Preliminary result Providers Seen During Your Hospitalization Provider Specialty Primary office phone Genna Gaston MD Emergency Medicine [phone] Jose Juan Johnson MD Internal Medicine [phone] Immunizations Administered for This Admission Name Date Influenza Vaccine (Quad) PF 9/21/2018 Your Primary Care Physician (PCP) Primary Care Physician Office Phone Office Fax Nisha Alicia [phone] You are allergic to the following Allergen Reactions Sulfa (Sulfonamide Antibiotics) Hives Imitrex (Sumatriptan Succinate) Other (comments) \"makes headaches worse\" Recent Documentation Height Weight Breastfeeding? BMI OB Status Smoking Status 1.6 m 81.6 kg No 31.89 kg/m2 Having regular periods Current Every Day Smoker Emergency Contacts Name Discharge Info Relation Home Work Mobile [address] [place] [address] CAREGIVER [3] Spouse [3] [phone] Patient Belongings The following personal items are in your possession at time of discharge: 
  Dental Appliances: None  Visual Aid: None      Home Medications: None   Jewelry: None  Clothing: Pants, Footwear, Shirt, Undergarments    Other Valuables: Eyeglasses, [employer], [employer] Please provide this summary of care documentation to your next provider. Signatures-by signing, you are acknowledging that this After Visit Summary has been reviewed with you and you have received a copy. Patient Signature:  ____________________________________________________________ Date:  ____________________________________________________________  
  
[facility] Provider Signature:  ____________________________________________________________ Date:  ____________________________________________________________

## 2018-09-21 ENCOUNTER — APPOINTMENT (OUTPATIENT)
Dept: MRI IMAGING | Age: 43
DRG: 103 | End: 2018-09-21
Attending: HOSPITALIST
Payer: COMMERCIAL

## 2018-09-21 ENCOUNTER — APPOINTMENT (OUTPATIENT)
Dept: NON INVASIVE DIAGNOSTICS | Age: 43
DRG: 103 | End: 2018-09-21
Attending: HOSPITALIST
Payer: COMMERCIAL

## 2018-09-21 ENCOUNTER — APPOINTMENT (OUTPATIENT)
Dept: CT IMAGING | Age: 43
DRG: 103 | End: 2018-09-21
Attending: PSYCHIATRY & NEUROLOGY
Payer: COMMERCIAL

## 2018-09-21 VITALS
TEMPERATURE: 98 F | RESPIRATION RATE: 18 BRPM | DIASTOLIC BLOOD PRESSURE: 68 MMHG | HEART RATE: 79 BPM | HEIGHT: 63 IN | OXYGEN SATURATION: 100 % | BODY MASS INDEX: 31.89 KG/M2 | SYSTOLIC BLOOD PRESSURE: 117 MMHG | WEIGHT: 180 LBS

## 2018-09-21 PROBLEM — R47.9 SPEECH ABNORMALITY: Status: ACTIVE | Noted: 2018-09-21

## 2018-09-21 PROBLEM — I69.322 CVA, OLD, DYSARTHRIA: Status: RESOLVED | Noted: 2018-09-20 | Resolved: 2018-09-21

## 2018-09-21 PROBLEM — I63.9 ACUTE CVA (CEREBROVASCULAR ACCIDENT) (HCC): Status: RESOLVED | Noted: 2018-09-20 | Resolved: 2018-09-21

## 2018-09-21 LAB
CHOLEST SERPL-MCNC: 293 MG/DL
ECHO AO ASC DIAM: 2.73 CM
ECHO AO ROOT DIAM: 2.41 CM
ECHO AV AREA PEAK VELOCITY: 3 CM2
ECHO AV AREA VTI: 2.9 CM2
ECHO AV AREA/BSA PEAK VELOCITY: 1.6 CM2/M2
ECHO AV AREA/BSA VTI: 1.5 CM2/M2
ECHO AV MEAN GRADIENT: 8 MMHG
ECHO AV PEAK GRADIENT: 14.3 MMHG
ECHO AV PEAK VELOCITY: 189.19 CM/S
ECHO AV VTI: 41.01 CM
ECHO IVC PROX: 1.39 CM
ECHO LA MAJOR AXIS: 3.57 CM
ECHO LA VOL 2C: 32.42 ML (ref 22–52)
ECHO LA VOL 4C: 28.53 ML (ref 22–52)
ECHO LA VOL BP: 34.81 ML (ref 22–52)
ECHO LA VOL/BSA BIPLANE: 18.83 ML/M2
ECHO LA VOLUME INDEX A2C: 17.53 ML/M2
ECHO LA VOLUME INDEX A4C: 15.43 ML/M2
ECHO LV E' LATERAL VELOCITY: 0.15 CM/S
ECHO LV E' SEPTAL VELOCITY: 0.11 CM/S
ECHO LV EDV A2C: 73 ML
ECHO LV EDV A4C: 88.5 ML
ECHO LV EDV BP: 81.4 ML (ref 56–104)
ECHO LV EDV INDEX A4C: 47.9 ML/M2
ECHO LV EDV INDEX BP: 44 ML/M2
ECHO LV EDV NDEX A2C: 39.5 ML/M2
ECHO LV EJECTION FRACTION A2C: 66 %
ECHO LV EJECTION FRACTION A4C: 68 %
ECHO LV EJECTION FRACTION BIPLANE: 66.1 % (ref 55–100)
ECHO LV ESV A2C: 24.9 ML
ECHO LV ESV A4C: 28.3 ML
ECHO LV ESV BP: 27.6 ML (ref 19–49)
ECHO LV ESV INDEX A2C: 13.5 ML/M2
ECHO LV ESV INDEX A4C: 15.3 ML/M2
ECHO LV ESV INDEX BP: 14.9 ML/M2
ECHO LV INTERNAL DIMENSION DIASTOLIC: 5.15 CM (ref 3.9–5.3)
ECHO LV INTERNAL DIMENSION SYSTOLIC: 3.51 CM
ECHO LV IVSD: 0.98 CM (ref 0.6–0.9)
ECHO LV MASS 2D: 221.6 G (ref 67–162)
ECHO LV MASS INDEX 2D: 119.8 G/M2
ECHO LV POSTERIOR WALL DIASTOLIC: 1 CM (ref 0.6–0.9)
ECHO LVOT DIAM: 2.04 CM
ECHO LVOT PEAK GRADIENT: 12.1 MMHG
ECHO LVOT PEAK VELOCITY: 173.82 CM/S
ECHO LVOT VTI: 36.36 CM
ECHO MV A VELOCITY: 86.77 CM/S
ECHO MV AREA PHT: 4.2 CM2
ECHO MV E DECELERATION TIME (DT): 179.3 MS
ECHO MV E VELOCITY: 1.13 CM/S
ECHO MV E/A RATIO: 0.01
ECHO MV E/E' LATERAL: 7.53
ECHO MV E/E' RATIO (AVERAGED): 8.9
ECHO MV E/E' SEPTAL: 10.27
ECHO MV PRESSURE HALF TIME (PHT): 52 MS
ECHO PV MAX VELOCITY: 102.52 CM/S
ECHO PV PEAK GRADIENT: 4.2 MMHG
ECHO RA AREA 4C: 11.56 CM2
ECHO RV INTERNAL DIMENSION: 3.17 CM
ECHO RV TAPSE: 2.5 CM (ref 1.5–2)
ECHO RVOT PEAK VELOCITY: 71.62 CM/S
ERYTHROCYTE [DISTWIDTH] IN BLOOD BY AUTOMATED COUNT: 13.5 % (ref 11.6–14.5)
EST. AVERAGE GLUCOSE BLD GHB EST-MCNC: 111 MG/DL
HBA1C MFR BLD: 5.5 % (ref 4.5–5.6)
HCT VFR BLD AUTO: 35.1 % (ref 35–45)
HDLC SERPL-MCNC: 46 MG/DL (ref 40–60)
HDLC SERPL: 6.4 {RATIO} (ref 0–5)
HGB BLD-MCNC: 11.3 G/DL (ref 12–16)
LDLC SERPL CALC-MCNC: 220.2 MG/DL (ref 0–100)
LIPID PROFILE,FLP: ABNORMAL
MAGNESIUM SERPL-MCNC: 1.8 MG/DL (ref 1.6–2.6)
MCH RBC QN AUTO: 28.7 PG (ref 24–34)
MCHC RBC AUTO-ENTMCNC: 32.2 G/DL (ref 31–37)
MCV RBC AUTO: 89.1 FL (ref 74–97)
PLATELET # BLD AUTO: 253 K/UL (ref 135–420)
PMV BLD AUTO: 11 FL (ref 9.2–11.8)
RBC # BLD AUTO: 3.94 M/UL (ref 4.2–5.3)
TRIGL SERPL-MCNC: 134 MG/DL (ref ?–150)
TSH SERPL DL<=0.05 MIU/L-ACNC: 0.86 UIU/ML (ref 0.36–3.74)
VLDLC SERPL CALC-MCNC: 26.8 MG/DL
WBC # BLD AUTO: 7.9 K/UL (ref 4.6–13.2)

## 2018-09-21 PROCEDURE — 74011250636 HC RX REV CODE- 250/636: Performed by: HOSPITALIST

## 2018-09-21 PROCEDURE — C8929 TTE W OR WO FOL WCON,DOPPLER: HCPCS

## 2018-09-21 PROCEDURE — 92610 EVALUATE SWALLOWING FUNCTION: CPT

## 2018-09-21 PROCEDURE — 74011250637 HC RX REV CODE- 250/637: Performed by: HOSPITALIST

## 2018-09-21 PROCEDURE — 36415 COLL VENOUS BLD VENIPUNCTURE: CPT | Performed by: HOSPITALIST

## 2018-09-21 PROCEDURE — 70450 CT HEAD/BRAIN W/O DYE: CPT

## 2018-09-21 PROCEDURE — 74011000250 HC RX REV CODE- 250: Performed by: HOSPITALIST

## 2018-09-21 PROCEDURE — 83735 ASSAY OF MAGNESIUM: CPT | Performed by: HOSPITALIST

## 2018-09-21 PROCEDURE — 90471 IMMUNIZATION ADMIN: CPT

## 2018-09-21 PROCEDURE — 83036 HEMOGLOBIN GLYCOSYLATED A1C: CPT | Performed by: HOSPITALIST

## 2018-09-21 PROCEDURE — 90686 IIV4 VACC NO PRSV 0.5 ML IM: CPT | Performed by: HOSPITALIST

## 2018-09-21 PROCEDURE — 84443 ASSAY THYROID STIM HORMONE: CPT | Performed by: HOSPITALIST

## 2018-09-21 PROCEDURE — 74011250637 HC RX REV CODE- 250/637: Performed by: INTERNAL MEDICINE

## 2018-09-21 PROCEDURE — 80061 LIPID PANEL: CPT | Performed by: HOSPITALIST

## 2018-09-21 PROCEDURE — 85027 COMPLETE CBC AUTOMATED: CPT | Performed by: HOSPITALIST

## 2018-09-21 PROCEDURE — 70551 MRI BRAIN STEM W/O DYE: CPT

## 2018-09-21 RX ORDER — BUTALBITAL, ACETAMINOPHEN, CAFFEINE AND CODEINE PHOSPHATE 50; 325; 40; 30 MG/1; MG/1; MG/1; MG/1
2 CAPSULE ORAL
COMMUNITY
End: 2018-10-27 | Stop reason: ALTCHOICE

## 2018-09-21 RX ORDER — MORPHINE SULFATE 2 MG/ML
0.5 INJECTION, SOLUTION INTRAMUSCULAR; INTRAVENOUS ONCE
Status: COMPLETED | OUTPATIENT
Start: 2018-09-21 | End: 2018-09-21

## 2018-09-21 RX ORDER — GUAIFENESIN 100 MG/5ML
81 LIQUID (ML) ORAL DAILY
Qty: 30 TAB | Refills: 0 | Status: SHIPPED | OUTPATIENT
Start: 2018-09-22

## 2018-09-21 RX ORDER — ATORVASTATIN CALCIUM 20 MG/1
20 TABLET, FILM COATED ORAL DAILY
Qty: 30 TAB | Refills: 0 | Status: SHIPPED | OUTPATIENT
Start: 2018-09-22

## 2018-09-21 RX ORDER — SODIUM CHLORIDE 9 MG/ML
15 INJECTION INTRAMUSCULAR; INTRAVENOUS; SUBCUTANEOUS ONCE
Status: COMPLETED | OUTPATIENT
Start: 2018-09-21 | End: 2018-09-21

## 2018-09-21 RX ORDER — BUTALBITAL, ACETAMINOPHEN AND CAFFEINE 50; 325; 40 MG/1; MG/1; MG/1
2 TABLET ORAL
Status: DISCONTINUED | OUTPATIENT
Start: 2018-09-21 | End: 2018-09-21 | Stop reason: HOSPADM

## 2018-09-21 RX ORDER — SODIUM CHLORIDE 9 MG/ML
INJECTION INTRAMUSCULAR; INTRAVENOUS; SUBCUTANEOUS
Status: DISCONTINUED
Start: 2018-09-21 | End: 2018-09-21 | Stop reason: HOSPADM

## 2018-09-21 RX ADMIN — LEVOTHYROXINE SODIUM 150 MCG: 75 TABLET ORAL at 08:15

## 2018-09-21 RX ADMIN — ASPIRIN 81 MG: 81 TABLET, CHEWABLE ORAL at 08:15

## 2018-09-21 RX ADMIN — SODIUM CHLORIDE 15 ML: 9 INJECTION, SOLUTION INTRAMUSCULAR; INTRAVENOUS; SUBCUTANEOUS at 11:30

## 2018-09-21 RX ADMIN — Medication 10 ML: at 05:28

## 2018-09-21 RX ADMIN — PANTOPRAZOLE SODIUM 40 MG: 40 TABLET, DELAYED RELEASE ORAL at 08:15

## 2018-09-21 RX ADMIN — BUTALBITAL, ACETAMINOPHEN AND CAFFEINE 2 TABLET: 50; 325; 40 TABLET ORAL at 11:55

## 2018-09-21 RX ADMIN — CLONAZEPAM 1 MG: 0.5 TABLET ORAL at 08:15

## 2018-09-21 RX ADMIN — INFLUENZA VIRUS VACCINE 0.5 ML: 15; 15; 15; 15 SUSPENSION INTRAMUSCULAR at 11:56

## 2018-09-21 RX ADMIN — PERFLUTREN 1 ML: 6.52 INJECTION, SUSPENSION INTRAVENOUS at 11:31

## 2018-09-21 RX ADMIN — MORPHINE SULFATE 0.5 MG: 2 INJECTION, SOLUTION INTRAMUSCULAR; INTRAVENOUS at 05:27

## 2018-09-21 RX ADMIN — SERTRALINE HYDROCHLORIDE 100 MG: 50 TABLET ORAL at 08:15

## 2018-09-21 RX ADMIN — HYDROCODONE BITARTRATE AND ACETAMINOPHEN 1 TABLET: 5; 325 TABLET ORAL at 08:22

## 2018-09-21 NOTE — PROGRESS NOTES
Spoke with [place], [address] CTA Head & Neck report has been scanned into computer--02:55

## 2018-09-21 NOTE — CONSULTS
NEUROLOGY CONSULTATION NOTE    Patient: Alejandro Giraldo MRN: [id number]  CSN: [id number]    YOB: 1975  Age: 38 y.o. Sex: female    DOA: 9/20/2018 LOS:  LOS: 1 day        Requesting Physician: Dr. Teresa Ellis  Reason for Consultation: Speech difficulty,? TIA/stroke             HISTORY OF PRESENT ILLNESS:   Alejandro Giraldo is a 78-year-old female with history of depression/anxiety, bipolar disorder and migraine headaches since age 11, who presented with speech difficulty yesterday. The patient started to have symptoms approximately two days ago. She was having severe pounding headaches and started to have difficulty bringing the words out. She has a speech as if shes stuttering. She didnt have any difficulty understanding the spoken language. She was working two days ago and continued to work but it was difficult to work with that speech. Yesterday, she woke up with no symptoms but started to have headaches and speech difficulty again. This time, she experienced generalized weakness. When she presented to ER, she was evaluated with a head CT, which showed a questionable hypodensity, suggestive of a stroke. Therefore, the patient was admitted for stroke workup. Overnight, she had more symptoms, therefore she was transferred to ICU. Her repeat head CT was unremarkable. The patient initially denied using any drugs but after mentioning the urinary toxicity screening, she admitted using cocaine in a party three days ago. She smokes one pack per day. She was started on topiramate approximately a month ago or so. Initially, she couldnt tolerate topiramate twice a day but she takes 100 mg at nighttime. Currently, she denies any lateralized weakness or numbness. She underwent a brain MRI. There is no acute stroke.      PAST MEDICAL HISTORY:  Past Medical History:   Diagnosis Date    Bipolar affective ([facility] [address]     Chronic obstructive pulmonary disease (HCC)     Depression     High cholesterol     Hypothyroidism     IBS (irritable bowel syndrome)     Migraine     Other ill-defined conditions(799.89)     frequent UTI's    UTI (urinary tract infection)      PAST SURGICAL HISTORY:  Past Surgical History:   Procedure Laterality Date    HX CHOLECYSTECTOMY      HX GYN      tubal ligation    HX HEENT      tonsillectomy     FAMILY HISTORY:  History reviewed. No pertinent family history.   SOCIAL HISTORY:  Social History     Social History    Marital status:      Spouse name: N/A    Number of children: N/A    Years of education: N/A     Social History Main Topics    Smoking status: Current Every Day Smoker     Packs/day: 0.50    Smokeless tobacco: Never Used    Alcohol use Yes      Comment: rarely    Drug use: No    Sexual activity: Yes     Birth control/ protection: Surgical     Other Topics Concern    None     Social History Narrative     MEDICATIONS:  Current Facility-Administered Medications   Medication Dose Route Frequency    0.9% sodium chloride infusion  100 mL/hr IntraVENous CONTINUOUS    sodium chloride (NS) flush 5-10 mL  5-10 mL IntraVENous Q8H    sodium chloride (NS) flush 5-10 mL  5-10 mL IntraVENous PRN    acetaminophen (TYLENOL) tablet 650 mg  650 mg Oral Q4H PRN    labetalol (NORMODYNE;TRANDATE) injection 5 mg  5 mg IntraVENous Q10MIN PRN    pantoprazole (PROTONIX) tablet 40 mg  40 mg Oral DAILY    aspirin chewable tablet 81 mg  81 mg Oral DAILY    clonazePAM (KlonoPIN) tablet 1 mg  1 mg Oral BID    HYDROcodone-acetaminophen (NORCO) 5-325 mg per tablet 1 Tab  1 Tab Oral Q4H PRN    fluticasone (FLONASE) 50 mcg/actuation nasal spray 1 Spray  1 Spray Both Nostrils DAILY    levothyroxine (SYNTHROID) tablet 150 mcg  150 mcg Oral ACB    sertraline (ZOLOFT) tablet 100 mg  100 mg Oral DAILY    zolpidem (AMBIEN) tablet 5 mg  5 mg Oral QHS PRN    atorvastatin (LIPITOR) tablet 20 mg  20 mg Oral DAILY    nicotine (NICODERM CQ) 14 mg/24 hr patch 1 Patch  1 Patch TransDERmal DAILY    loperamide (IMODIUM) capsule 2 mg  2 mg Oral Q4H PRN    influenza vaccine 2018-19 (6 mos+)(PF) (FLUARIX QUAD/FLULAVAL QUAD) injection 0.5 mL  0.5 mL IntraMUSCular PRIOR TO DISCHARGE    ziprasidone (GEODON) capsule 120 mg  120 mg Oral DAILY     Prior to Admission medications    Medication Sig Start Date End Date Taking? Authorizing Provider   topiramate (TOPAMAX) 100 mg tablet Take  by mouth nightly. Yes Historical Provider   promethazine (PHENERGAN) 25 mg tablet Take 25 mg by mouth every eight (8) hours as needed for Nausea. Yes Historical Provider   clonazePAM (KLONOPIN) 1 mg tablet Take 1 mg by mouth two (2) times a day. Isaac Zarate MD   methylPREDNISolone (MEDROL, FATOU,) 4 mg tablet Take as package directs 1/13/18   KASSY Jones   guaiFENesin-codeine (ROBITUSSIN AC) 100-10 mg/5 mL solution Take 7.5 mL by mouth three (3) times daily as needed for Cough. Max Daily Amount: 22.5 mL. 1/13/18   KASSY Grewal   BUTALB/ACETAMINOPHEN/CAFFEINE (FIORICET PO) Take  by mouth. Isaac Zarate MD   HYDROcodone-acetaminophen (NORCO) 5-325 mg per tablet Take 1 Tab by mouth every four (4) hours as needed for Pain. 6/21/14   Vicente Brunner MD   fluticasone (FLONASE) 50 mcg/actuation nasal spray 1 [place] by Both Nostrils route daily. 1 spray in each nostril. 4/7/14   KASSY Smith   sertraline (ZOLOFT) 100 mg tablet Take 200 mg by mouth daily. Isaac Zarate MD   levothyroxine (SYNTHROID) 150 mcg tablet Take  by mouth Daily (before breakfast). Isaac Zarate MD   esomeprazole (NEXIUM) 40 mg capsule Take  by mouth daily. Isaac Zarate MD   ziprasidone (GEODON) 40 mg capsule Take 120 mg by mouth nightly. Indications: BIPOLAR DISORDER IN REMISSION    Isaac Zarate MD   zolpidem (AMBIEN) 10 mg tablet Take  by mouth nightly as needed.     Isaac Zarate MD       ALLERGIES:  Allergies   Allergen Reactions    Sulfa (Sulfonamide Antibiotics) Hives    Imitrex [Sumatriptan Succinate] Other (comments) \"makes headaches worse\"       Review of Systems  GENERAL: No fevers or chills. HEENT: No change in vision, earache, tinnitus, sore throat or sinus congestion. Headache+, photophobia and phonophobia+  NECK: No pain or stiffness. CARDIOVASCULAR: No chest pain or pressure. No palpitations. PULMONARY: No shortness of breath, cough or wheeze. GASTROINTESTINAL: No abdominal pain, nausea, vomiting or diarrhea. GENITOURINARY: No urinary frequency, urgency, hesitancy or dysuria. MUSCULOSKELETAL: No joint or muscle pain, no back pain, no recent trauma. DERMATOLOGIC: No rash, no itching, no lesions. ENDOCRINE: No polyuria, polydipsia, no heat or cold intolerance. HEMATOLOGICAL: No anemia or easy bruising or bleeding. NEUROLOGIC: No seizures, numbness, tingling but generalized weakness. Speech difficulty+    PHYSICAL EXAMINATION:     Visit Vitals    /69    Pulse (!) 56    Temp 98 °F (36.7 °C)    Resp 18    Ht 5' 3\" (1.6 m)    Wt 81.6 kg (179 lb 14.3 oz)    SpO2 100%    Breastfeeding No    BMI 31.87 kg/m2      O2 Device: Room air  GENERAL: Pleasant, in no apparent distress. HEENT: Moist mucous membranes, sclerae anicteric, scalp is atraumatic. CVS: Regular rate and rhythm, no murmurs or gallops. No carotid bruits. PULMONARY: Clear to auscultation bilaterally. No rales or rhonchi. No wheezing. EXTREMITIES: Normal range of motion at all sites. No deformities. ABDOMEN: Soft, nontender. SKIN: No rashes or ecchymoses. Warm and dry. NEUROLOGIC: Alert and oriented x3. Speech is intermittently stuttering but there's no clear pattern. There is no dysarthria or aphasia to my examination. Cranial nerves: Face is symmetric with symmetric smile. Facial sensation is intact. Extraocular movements are intact with no nystagmus. Visual fields are full to confrontation. PERRL. Tongue is midline. Palate elevates symmetrically. Hearing intact to speech.  Sternocleidomastoid and trapezius strengths are full bilaterally. Motor: Normal tone and normal bulk on all four extremities. Strength is full on all four segmentally. There is no pronator drift or orbiting. Sensory: Intact to pinprick and touch on all four. Normal vibratory sensation on toes bilaterally. Coordination: Intact coordination with finger-nose-finger bilaterally. Normal fine movements. No bradykinesia detected. Deep tendon reflexes: 2+ at biceps, brachioradialis, patella and ankles bilaterally. Labs: Results:       Chemistry Recent Labs      09/20/18 1955   GLU  75   NA  140   K  3.8   CL  106   CO2  26   BUN  7   CREA  0.93   CA  8.3*   AGAP  8   BUCR  8*   AP  76   TP  7.1   ALB  3.8   GLOB  3.3   AGRAT  1.2      CBC w/Diff Recent Labs      09/21/18   0338  09/20/18 1955   WBC  7.9  8.3   RBC  3.94*  4.10*   HGB  11.3*  12.0   HCT  35.1  36.6   PLT  253  261   GRANS   --   51   LYMPH   --   36   EOS   --   6*      Cardiac Enzymes Recent Labs      09/20/18 1955   CPK  77   CKND1  CALCULATION NOT PERFORMED WHEN RESULT IS BELOW LINEAR LIMIT      Coagulation No results for input(s): PTP, INR, APTT in the last 72 hours. No lab exists for component: INREXT    Lipid Panel Lab Results   Component Value Date/Time    Cholesterol, total 293 (H) 09/21/2018 03:38 AM    HDL Cholesterol 46 09/21/2018 03:38 AM    LDL, calculated 220.2 (H) 09/21/2018 03:38 AM    VLDL, calculated 26.8 09/21/2018 03:38 AM    Triglyceride 134 09/21/2018 03:38 AM    CHOL/HDL Ratio 6.4 (H) 09/21/2018 03:38 AM      BNP No results for input(s): BNPP in the last 72 hours.    Liver Enzymes Recent Labs      09/20/18 1955   TP  7.1   ALB  3.8   AP  76   SGOT  14*      Thyroid Studies No results found for: T4, T3U, TSH, TSHEXT       Radiology:  Mri Brain Wo Cont    Result Date: 9/21/2018  EXAM: MRI BRAIN W/O CONTRAST INDICATION: Dysarthria, abnormal CT with possible left thalamic acute/subacute infarct COMPARISON: CT head 9/20/2018, no prior MR brain TECHNIQUE: Multiplanar multi sequence MR imaging of the brain was performed utilizing axial T2, FLAIR, diffusion, T2 gradient echo, and multiplanar T1 pre contrast imaging. No gadolinium was administered due to specific clinician request. _______________________ FINDINGS: VENTRICLES/EXTRA-AXIAL SPACES: The ventricles and sulci are normal in their size and configuration. BRAIN PARENCHYMA: No left thalamic abnormality is seen. Isolated T2/FLAIR hyperintense nonenhancing white matter focus is seen on the right just posterior to the basal ganglia. No corpus callosal, brainstem, or cerebellar lesion is seen. No evidence of intracranial mass effect, midline shift, or herniation. No abnormal restricted diffusion to suggest acute infarct. No susceptibility artifact to suggest intraparenchymal hemorrhage. VASCULATURE:  The major intracranial vascular flow voids are grossly normal. ORBITS: The visualized orbits are unremarkable. PARANASAL SINUSES/MASTOIDS: Mild mucoperiosteal thickening is scattered in the paranasal sinuses. No air-fluid levels are present. Visualized mastoid air cells are clear. OSSEOUS STRUCTURES: Unremarkable OTHER: None.  ________________________     IMPRESSION: 1. No acute infarct, hemorrhage, mass effect, or herniation. 2. Isolated small T2/FLAIR hyperintense white matter focus right deep hemispheric white matter. This finding is quite nonspecific and is not suggestive of a demyelinating process. Similar findings may be seen in patient's of all ages. 3. Very mild paranasal sinus disease without air-fluid level to suggest acute sinusitis. Ct Head Wo Cont    Result Date: 9/21/2018  EXAM: CT head INDICATION: Weakness. COMPARISON: September 20, 2018. TECHNIQUE: Axial CT imaging of the head was performed without intravenous contrast. Dose reduction techniques used: automated exposure control, adjustment of the mAs and/or kVp according to patient size, and iterative reconstruction techniques. _______________ FINDINGS: BRAIN AND POSTERIOR FOSSA: The sulci, folia, ventricles and basal cisterns are within normal limits for the patient's age. There is no intracranial hemorrhage, mass effect, or midline shift. There are no areas of abnormal parenchymal attenuation. EXTRA-AXIAL SPACES AND MENINGES: There are no abnormal extra-axial fluid collections. CALVARIUM: Intact. SINUSES: Clear. OTHER: None. _______________     IMPRESSION: No acute intracranial abnormalities. Ct Head Wo Cont    Result Date: 9/20/2018  EXAM: CT head INDICATION: Slurred speech and headache COMPARISON: None. TECHNIQUE: Axial CT imaging of the head was performed without intravenous contrast. One or more dose reduction techniques were used on this CT: automated exposure control, adjustment of the Ams and/or kVp according to patient size, and iterative reconstruction techniques. The specific techniques used on this CT exam have been documented in the patient's electronic medical record. _______________ FINDINGS: BRAIN AND POSTERIOR FOSSA: The sulci, folia, ventricles and basal cisterns are within normal limits for the patient's age. There is no intracranial hemorrhage, mass effect, or midline shift. There is a subtle low-attenuation area in the left thalamus possibly representing subacute to acute type infarct. EXTRA-AXIAL SPACES AND MENINGES: There are no abnormal extra-axial fluid collections. CALVARIUM: Intact. SINUSES: Is mucoperiosteal thickening of the ethmoid sinuses suggesting chronic paranasal sinus disease. . OTHER: None. _______________     IMPRESSION: No midline shift, mass effect or acute hemorrhage Subtle low-attenuation area in the left thalamus suggesting possible subacute to acute type infarct. Image 12.     Cta Head Neck W Wo Cont    Result Date: 9/21/2018  EXAM: CTA HEAD AND NECK INDICATION: Dysarthria COMPARISON: No prior studies TECHNIQUE:  Multiple axial CT images of the neck were obtained extending from the level of the aortic arch to the skull base after the administration of the IV contrast utilizing a CTA protocol. Maximum intensity projection reconstructions were performed in multiple planes. Multiple axial CT images of the head were obtained extending from below the level of the skull base to the vertex after the administration of the IV contrast utilizing a CTA protocol. Maximum intensity projection reconstructions were performed in three planes. Additional 3 D reconstructions were performed at a separate workstation. One or more dose reduction techniques were used on this CT: automated exposure control, adjustment of the mAs and/or kVp according to patient's size, and iterative reconstruction techniques. The specific techniques utilized on this CT exam have been documented in the patient's electronic medical record. ___________________ FINDINGS: CTA NECK The origins of the great vessels along the aortic arch are within normal limits. The left common carotid is unremarkable. The left carotid bifurcation is unremarkable. Estimated minimal luminal diameter proximal left ICA 3.9 mm. Estimated luminal diameter of normal left ICA cervical segment is 3.9 mm. Estimated degree of stenosis of the left ICA based upon NASCET criteria is 0%. Remainder of left ICA cervical segment is unremarkable. The right common carotid is unremarkable. The right carotid bifurcation is unremarkable. Estimated minimal luminal diameter proximal right ICA 4.7 mm. Estimated luminal diameter of normal right ICA cervical segment is 4.6 mm. Estimated degree of stenosis of the right ICA based upon NASCET criteria is 0%. Remainder of the right ICA cervical segment is within normal limits without focal stenosis. Vertebral arteries are relatively equal in size left perhaps slightly larger than right. No focal vertebral artery stenosis is seen. Degenerative changes are seen in the spine. Visualized lung apices are clear.  CTA HEAD There is no evidence of aneurysm. There is no focal high-grade stenosis within the intracranial arteries. No focal carotid siphon stenosis is seen. The carotid terminus is unremarkable. No focal NEY stenosis is seen. An anterior communicating artery is present. The middle cerebral artery bifurcations are unremarkable. The vertebral arteries are relatively equal in size. PICA origins are unremarkable. The basilar artery is unremarkable. Posterior cerebral arteries are unremarkable. A very small posterior communicating artery is present on the left. The dural venous sinuses are patent. ___________________     IMPRESSION: 1. No definite large vessel occlusion. 2.  No hemodynamically significant ICA stenosis, based on NASCET criteria. 3. No focal vertebral artery stenosis is seen. 4. No high-grade intracranial stenosis. ASSESSMENT/IMPRESSION:  The clinical presentation is speech difficulty seems to be most likely secondary to side effects of topiramate and/or a combination of several factors including migraine headaches, medication side effects and cocaine use. I asked the patient to refrain from cocaine use. I also asked the patient to discontinue smoking. It is reasonable for the patient to continue aspirin and atorvastatin at low dose. I do feel that topiramate seems to be contributing to her speech difficulty, therefore, it needs to be stopped. The patient needs to be on another preventive medication under the care of her primary care    RECOMMENDATIONS:  1. Continue atorvastatin aspirin 81 mg daily  2. The patient can be discharged home. 3. Discontinue topiramate  4. Tylenol extra strength as needed for migraine attacks  5. PT/OT and disposition planning. The patient can be discharged home from neurology perspective as I can see.     Please do not hesitate to return with any questions.    ------------------------------------  Juaquin Nuñez MD  9/21/2018  8:13 AM

## 2018-09-21 NOTE — PROGRESS NOTES
Cm spoke with pt at bedside,discharge order for today,pt will be returning back home with spouse,cm discussed d/c planning pt independent with care,cm dicussed positive drug screen,cm offered CSB services pt declined,no further cm needs at this time,pt will need to follow up with neuro cms notified to assist with making appointment. Care Management Interventions PCP Verified by CM: Yes 
Palliative Care Criteria Met (RRAT>21 & CHF Dx)?: No 
Mode of Transport at Discharge: Other (see comment) () Transition of Care Consult (CM Consult):  (home) Discharge Durable Medical Equipment: No 
Health Maintenance Reviewed: Yes Physical Therapy Consult: Yes Occupational Therapy Consult: Yes Speech Therapy Consult: Yes Current Support Network: Lives with Spouse Confirm Follow Up Transport: Self Plan discussed with Pt/Family/Caregiver: Yes Freedom of Choice Offered:  (n/a) Discharge Location Discharge Placement: Home

## 2018-09-21 NOTE — PROGRESS NOTES
Reason for Admission:  Slurred speech and migraine RRAT Score5 Plan for utilizing home health:  TBD Likelihood of Readmission:  no 
                      
Transition of Care Plan: chart reviewed per chart pt came to ED with c/o slurred speech and migraine symptoms started on Sunday became worse, admitted for stroke workup pt has positive UDS pt transferred from floor to ICU due to new onset of facial droop cm will follow thru for d/c planning.

## 2018-09-21 NOTE — PROGRESS NOTES
Problem: Ischemic Stroke: Discharge Outcomes Goal: *Tolerating diet Outcome: Not Progressing Towards Goal 
NPO until further tests are complete. Goal: *Smoking cessation discussed,if applicable(Stroke Metric) Outcome: Progressing Towards Goal 
Pt currently smokes a pack a day, she states \" I know I am going to have to make some changes. \"

## 2018-09-21 NOTE — PROGRESS NOTES
0400- Primary RN reports assessment of new facial droop and arm drift. In to assist primary RN. NIH assessment completed per flow sheet. Alert, oriented, cooperative. NAD. Answers all questions, speech with slight slurring and small amount of fluency loss. MAEE, no drift or inequality noted. Almost imperceptible left sided droop around mouth noted. Reports cheek sensation feels stronger on the same side as the droop. Forehead sensation is equal.  Extremity sensation is equal.  to be updated.

## 2018-09-21 NOTE — DISCHARGE SUMMARY
Discharge Summary  Subjective:       Admit Date: 9/20/2018  Discharge Date:  9/21/2018, 10:06 AM    Discharging Physician: Ruben Powell pager [phone]  Primary Care Provider:  Mirna Gibson MD    Patient ID:  Martha Lantigua  38 y.o. female  1975    Reason for Admission:  CVA, old, dysarthria    Discharge Diagnosis:   - dysarthria  - confusion  - tobacco dependence    Patient Active Problem List   Diagnosis Code    Migraine G43.909    Hypothyroidism E03.9    Depression F32.9    Chronic obstructive pulmonary disease ([address] J44.9    Bipolar affective ([address] F31.9    IBS (irritable bowel syndrome) K58.9    Smoker F17.200    Speech abnormality R47.9       Consultants:    neurology    Hospital Course:   Reason for admission ( Admitting HPI): \" Martha Lantigua is a 49-year-old female with history of depression/anxiety, bipolar disorder and migraine headaches since age 11, who presented with speech difficulty yesterday. The patient started to have symptoms approximately two days ago. She was having severe pounding headaches and started to have difficulty bringing the words out. She has a speech as if shes stuttering. She didnt have any difficulty understanding the spoken language. She was working two days ago and continued to work but it was difficult to work with that speech. Yesterday, she woke up with no symptoms but started to have headaches and speech difficulty again. This time, she experienced generalized weakness. When she presented to ER, she was evaluated with a head CT, which showed a questionable hypodensity, suggestive of a stroke. Therefore, the patient was admitted for stroke workup. Overnight, she had more symptoms, therefore she was transferred to ICU. Her repeat head CT was unremarkable. The patient initially denied using any drugs but after mentioning the urinary toxicity screening, she admitted using cocaine in a party three days ago. She smokes one pack per day.  She was started on topiramate approximately a month ago or so. Initially, she couldnt tolerate topiramate twice a day but she takes 100 mg at nighttime. Currently, she denies any lateralized weakness or numbness. She underwent a brain MRI. There is no acute stroke. \"    She was admtitted to the ICU, had negative MRI, topamax was stopped and she was seen by neurology who recommended DC home w outpt follow up. She is stable for DC home today. Pertinent Imaging/ Operative procedures:   MRI brain:\" 1. No acute infarct, hemorrhage, mass effect, or herniation.     2. Isolated small T2/FLAIR hyperintense white matter focus right deep  hemispheric white matter. This finding is quite nonspecific and is not  suggestive of a demyelinating process. Similar findings may be seen in  patient's of all ages.       3. Very mild paranasal sinus disease without air-fluid level to suggest acute  sinusitis. \"    2d echo:\" Saline contrast was given to evaluate for intracardiac shunt. No shunt seen. · Definity contrast was given to enhance imaging  · Estimated left ventricular ejection fraction is 61 - 65%. Biplane method used to measure ejection fraction.  Normal left ventricular wall motion, no regional wall motion abnormality noted.:     Pertinent Results:    CMP:   Lab Results   Component Value Date/Time     09/20/2018 07:55 PM    K 3.8 09/20/2018 07:55 PM     09/20/2018 07:55 PM    CO2 26 09/20/2018 07:55 PM    AGAP 8 09/20/2018 07:55 PM    GLU 75 09/20/2018 07:55 PM    BUN 7 09/20/2018 07:55 PM    CREA 0.93 09/20/2018 07:55 PM    GFRAA >60 09/20/2018 07:55 PM    GFRNA >60 09/20/2018 07:55 PM    CA 8.3 (L) 09/20/2018 07:55 PM    MG 1.8 09/21/2018 03:38 AM    ALB 3.8 09/20/2018 07:55 PM    TP 7.1 09/20/2018 07:55 PM    GLOB 3.3 09/20/2018 07:55 PM    AGRAT 1.2 09/20/2018 07:55 PM    SGOT 14 (L) 09/20/2018 07:55 PM    ALT 17 09/20/2018 07:55 PM     CBC:   Lab Results   Component Value Date/Time    WBC 7.9 09/21/2018 03:38 AM HGB 11.3 (L) 09/21/2018 03:38 AM    HCT 35.1 09/21/2018 03:38 AM     09/21/2018 03:38 AM         Physical Exam on Discharge:  Visit Vitals    /66    Pulse 63    Temp 98 °F (36.7 °C)    Resp 16    Ht 5' 3\" (1.6 m)    Wt 81.6 kg (180 lb)    SpO2 100%    Breastfeeding No    BMI 31.89 kg/m2     Body mass index is 31.89 kg/(m^2). GEN: NAD  HEART: S1 S2  NEURO: nonfocal   Condition at discharge: stable    Discharge Medications  Current Discharge Medication List      START taking these medications    Details   aspirin 81 mg chewable tablet Take 1 Tab by mouth daily. Qty: 30 Tab, Refills: 0      atorvastatin (LIPITOR) 20 mg tablet Take 1 Tab by mouth daily. Qty: 30 Tab, Refills: 0         CONTINUE these medications which have NOT CHANGED    Details   clonazePAM (KLONOPIN) 1 mg tablet Take 1 mg by mouth two (2) times a day. BUTALB/ACETAMINOPHEN/CAFFEINE (FIORICET PO) Take  by mouth. HYDROcodone-acetaminophen (NORCO) 5-325 mg per tablet Take 1 Tab by mouth every four (4) hours as needed for Pain. Qty: 20 Tab, Refills: 0      fluticasone (FLONASE) 50 mcg/actuation nasal spray 1 [place] by Both Nostrils route daily. 1 spray in each nostril. Qty: 1 Bottle, Refills: 0      sertraline (ZOLOFT) 100 mg tablet Take 200 mg by mouth daily. levothyroxine (SYNTHROID) 150 mcg tablet Take  by mouth Daily (before breakfast). esomeprazole (NEXIUM) 40 mg capsule Take  by mouth daily. ziprasidone (GEODON) 40 mg capsule Take 120 mg by mouth nightly. Indications: BIPOLAR DISORDER IN REMISSION      zolpidem (AMBIEN) 10 mg tablet Take  by mouth nightly as needed.          STOP taking these medications       topiramate (TOPAMAX) 100 mg tablet Comments:   Reason for Stopping:         promethazine (PHENERGAN) 25 mg tablet Comments:   Reason for Stopping:         methylPREDNISolone (MEDROL, FATOU,) 4 mg tablet Comments:   Reason for Stopping:         guaiFENesin-codeine (ROBITUSSIN AC) 100-10 mg/5 mL solution Comments:   Reason for Stopping:               Disposition: home   Follow up:  [pcp, neurology  Restrictions: none    Diagnostic Imaging/Labs pending at discharge: none      Cassidy Quiles, [address] [facility]  Internal Medicine/Geriatrics    Time Spent 40 minutes with >50% coordination of care          CC: Eduin Escobedo MD

## 2018-09-21 NOTE — PROGRESS NOTES
2300: Pt arrived to the unit. Appears to be in stable condition. No acute changes noted.  at bedside. Pt requesting home medications and pain meds. Notified Dr. Collin Mcdonald. Orders to be placed in chart. 0030: Pt remains stable. No acute distress noted. No requests at this time. Frequent Neuro completed. Will monitor. 0230: Pt resting quietly. No acute distress noted. Frequent Neuro check completed and remains unchanged. Will monitor. 0400: Assessed pt at this time. Pt with c/o heaviness on right side along with increased weakness. NIH assessment completed. Pt with worsening dysarthria. Slight drift noted on right side. Called Constantine Thomas RN to assess patients neurological status. Vitals remain stable. Paged Dr. Collin Mcdonald who recommended to change MRI to stat. 26: Notified Dr. Becka Frey of assessment findings that have changed. Orders received for a stat head CT and to transfer the patient to ICU. Will notify nursing supervisor. Called MRI tech and will complete MRI checklist. 
 
0430: Pt escorted to CT. Remains stable. No acute changes.  
 
 
8877: Pt escorted to ICU with Jose Pulido RN

## 2018-09-21 NOTE — PROGRESS NOTES
TRANSFER - IN REPORT: 
 
Verbal report received from Day RN(name) on Johanna Ring  being received from ED(unit) for routine progression of care Report consisted of patients Situation, Background, Assessment and  
Recommendations(SBAR). Information from the following report(s) SBAR, Kardex and MAR was reviewed with the receiving nurse. Opportunity for questions and clarification was provided. Assessment completed upon patients arrival to unit and care assumed.

## 2018-09-21 NOTE — ROUTINE PROCESS
Bedside and Verbal shift change report given to Christophe Kinsey RN (oncoming nurse) by Sachi Rain RN (offgoing nurse).  Report included the following information SBAR, Kardex, Intake/Output, MAR, Recent Results, Med Rec Status and Cardiac Rhythm SR.

## 2018-09-21 NOTE — ROUTINE PROCESS
TRANSFER - OUT REPORT: 
 
Verbal report given to RUFUS Caba RN(name) on Dara Calderon  being transferred to Tele(unit) for routine progression of care Report consisted of patients Situation, Background, Assessment and  
Recommendations(SBAR). Information from the following report(s) ED Summary, MAR and Recent Results was reviewed with the receiving nurse. Lines:  
Peripheral IV 09/20/18 Left Antecubital (Active) Site Assessment Clean, dry, & intact 9/20/2018 10:05 PM  
Phlebitis Assessment 0 9/20/2018 10:05 PM  
Infiltration Assessment 0 9/20/2018 10:05 PM  
Dressing Status Clean, dry, & intact 9/20/2018 10:05 PM  
Dressing Type Transparent 9/20/2018 10:05 PM  
Hub Color/Line Status Pink;Patent; Flushed 9/20/2018 10:05 PM  
Action Taken Blood drawn 9/20/2018 10:05 PM  
Alcohol Cap Used Yes 9/20/2018  8:01 PM  
  
 
Opportunity for questions and clarification was provided. Patient transported with: 
 Registered Nurse

## 2018-09-21 NOTE — PROGRESS NOTES
Problem: Dysphagia (Adult) Goal: *Acute Goals and Plan of Care (Insert Text) Dysphagia Present:   No 
 
Recommendations: 
Diet: Regular/thin Meds: Per patient preference Patient will: 1. Participate in training and education related to continued aspiration risk, diet recs and compensatory strategies (goal met). Outcome: Resolved/Met Date Met: 09/21/18 Speech LAnguage Pathology bedside swallow evaluation AND DISCHARGE Patient: Naya Gibson (42 y.o. female) Date: 9/21/2018 Primary Diagnosis: CVA, old, dysarthria Precautions: Fall PLOF: Independent ASSESSMENT : 
Clinical beside swallow eval completed per MD orders. Pt A&Ox4. Functional communication. Intelligibility >90%. Pt with intermittent disfluency and decreased rate of speech. Per Dr. Adams Alu; \"Speech difficulty seems to be most likely secondary to side effects of topiramate and/or a combination of several factors including migraine headaches, medication side effects and cocaine use\". Cognitive-linguistic function appears intact. OM examination revealed oral motor structures functional for mastication and deglutition. Presented with thin liquid, puree, and solid trials and observed during med pass. Exhibited + bolus cohesion, manipulation and A-P transit. Further exhibited + swallow timing/reflex and hyolaryngeal excursion. Pt able to manipulate and clear with 0 clinical s/s aspiration and/or oropharyngeal dysphagia. Pt safe for regular solid, thin liquid diet. 0 formal ST needs for dysphagia indicated at this time. SLP educated pt on role of speech therapist in current setting with re: speech/swallow; encouraged to pursue OP speech therapy if disfluency continues, verbalized comprehension. SLP available for re-evaluation if indicated by MD. Results d/w RN, Evy Blake. Thank you for this referral.  
Estelita Mcghee, SLP  
 
PLAN : 
Recommendations and Planned Interventions: 
No formal ST needs ID'd for dysphagia. Eval only. Discharge Recommendations: None SUBJECTIVE:  
Patient stated If my speech stays like this can't go back to work. OBJECTIVE:  
 
Past Medical History:  
Diagnosis Date  Bipolar affective ([facility]  Chronic obstructive pulmonary disease ([facility] [address]  Depression  High cholesterol  Hypothyroidism  IBS (irritable bowel syndrome)  Migraine  Other ill-defined conditions(799.89) frequent UTI's  UTI (urinary tract infection) Past Surgical History:  
Procedure Laterality Date  HX CHOLECYSTECTOMY  HX GYN    
 tubal ligation  HX HEENT    
 tonsillectomy Prior Level of Function/Home Situation: Independent Home Situation Home Environment: Trailer/mobile home One/Two Story Residence: One story Living Alone: No 
Support Systems: Parent Patient Expects to be Discharged to[de-identified] Trailer/mobile home Current DME Used/Available at Home: None Diet prior to admission: Regular/thin Current Diet:  Regular/thin  
Cognitive and Communication Status: 
Neurologic State: Alert Orientation Level: Oriented X4 Cognition: Appropriate decision making Perception: Appears intact Perseveration: No perseveration noted Safety/Judgement: Good awareness of safety precautions Oral Assessment: 
Oral Assessment Labial: No impairment Dentition: Natural;Intact Oral Hygiene: Good Lingual: No impairment Velum: No impairment Mandible: No impairment P.O. Trials: 
Patient Position: WDX 01 Vocal quality prior to P.O.: No impairment Consistency Presented: Thin liquid; Solid;Puree;Pill/Tablet How Presented: Self-fed/presented;Straw;Successive swallows Bolus Acceptance: No impairment Bolus Formation/Control: No impairment Propulsion: No impairment Oral Residue: None Initiation of Swallow: No impairment Laryngeal Elevation: Functional 
Aspiration Signs/Symptoms: None Pharyngeal Phase Characteristics: No impairment, issues, or problems Effective Modifications: None Cues for Modifications: None Oral Phase Severity: No impairment Pharyngeal Phase Severity : No impairment GCODESwallowing:  Swallow Current Status CH= 0% 
 Swallow Goal Status CH= 0% 
 Swallow D/C Status CH= 0% The severity rating is based on the following outcomes: MYRON Noms Swallow Level 7 Clinical Judgement PAIN: 
Start of Eval: 0 End of Eval: 0 After evaluation:  
[]            Patient left in no apparent distress sitting up in chair 
[x]            Patient left in no apparent distress in bed 
[x]            Call bell left within reach [x]            Nursing notified []            Family present 
[]            Caregiver present 
[]            Bed alarm activated COMMUNICATION/EDUCATION:  
[x]            Aspiration precautions; swallow safety; compensatory techniques. [x]            Patient/family have participated as able in goal setting and plan of care. [x]            Patient/family agree to work toward stated goals and plan of care. []            Patient understands intent and goals of therapy; neutral about participation. []            Patient unable to participate in goal setting/plan of care; educ ongoing with interdisciplinary staff 
[]         Posted safety precautions in patient's room. Thank you for this referral. 
Sonali Sanchez, BERTIN Time Calculation: 18 mins

## 2018-09-21 NOTE — H&P
History & Physical 
 
Patient: Joisas Arenas MRN: [id number]  CSN: [id number] YOB: 1975  Age: 38 y.o. Sex: female DOA: 9/20/2018 Primary Care Provider:  Rony Soliman MD 
 
 
Assessment/Plan Hospital Problems  Never Reviewed Codes Class Noted POA  
 CVA, old, dysarthria ICD-10-CM: F12.560 ICD-9-CM: 438.13  9/20/2018 Unknown Migraine ICD-10-CM: M52.396 ICD-9-CM: 346.90  9/20/2018 Unknown Hypothyroidism ICD-10-CM: E03.9 ICD-9-CM: 244.9  9/20/2018 Unknown Depression ICD-10-CM: F32.9 ICD-9-CM: 785  9/20/2018 Unknown Chronic obstructive pulmonary disease (HCC) ICD-10-CM: J44.9 ICD-9-CM: 303  9/20/2018 Unknown Bipolar affective ([facility] ICD-10-CM: F31.9 ICD-9-CM: 296.80  9/20/2018 Unknown Acute CVA (cerebrovascular accident) ([facility] ICD-10-CM: I63.9 ICD-9-CM: 434.91  9/20/2018 Unknown IBS (irritable bowel syndrome) ICD-10-CM: K58.9 ICD-9-CM: 564.1  9/20/2018 Unknown Smoker ICD-10-CM: Y35.667 ICD-9-CM: 305.1  9/20/2018 Unknown Admit to tele Cvs/dysarthria Ct : Subtle low-attenuation area in the left thalamus suggesting possible subacute to 
acute type infarct Will have mri brain,  cta neck/head ,echo , neuro check/cardiac monitoring Aspirin. hyper coagulopathy profile Load lipitor , lipid profile , iv hydration Hermila Ceasar check Arlyss Al Speech/ pt/ot consult Dr. Donnie Cruz called per ER IBS Mainly diarrhea, need gi as out-pt, imodium, Smoker : nicotine patch Depression/bipolar Continue home medication Copd Stable , breathing tx prn Migraine Continue home meds  
 
hypothyroidism Continue synthroid Full code DVT : scd . ppi proph CC: headache, slurred speech HPI:  
 
Josias Arenas is a 38 y.o. female who hx of depression, bipolar, copd, smoker came to er due to headache and slurred speech yesterday morning at 7: AM. Associated with some numbness of left arms, no gait change or weakness. Ct head: Subtle low-attenuation area in the left thalamus suggesting possible subacute to 
acute type infarct. Aspirin 324 mg was given per ER. She is a smoker , she also has IBS-mainly diarrhea. Visit Vitals  /81 (BP 1 Location: Right arm, BP Patient Position: At rest)  Pulse 67  Temp 98.1 °F (36.7 °C)  Resp 18  Ht 5' 3\" (1.6 m)  Wt 81.6 kg (180 lb)  SpO2 100%  BMI 31.89 kg/m2 O2 Device: Room air Past Medical History:  
Diagnosis Date  Bipolar affective ([address]  Chronic obstructive pulmonary disease ([facility] [address] [facility]  Depression  High cholesterol  Hypothyroidism  IBS (irritable bowel syndrome)  Migraine  Other ill-defined conditions(799.89) frequent UTI's  UTI (urinary tract infection) Past Surgical History:  
Procedure Laterality Date  HX CHOLECYSTECTOMY  HX GYN    
 tubal ligation  HX HEENT    
 tonsillectomy History reviewed. No pertinent family history. Social History Social History  Marital status:  Spouse name: N/A  
 Number of children: N/A  
 Years of education: N/A Social History Main Topics  Smoking status: Current Every Day Smoker Packs/day: 0.50  Smokeless tobacco: Never Used  Alcohol use Yes Comment: rarely  Drug use: No  
 Sexual activity: Yes Birth control/ protection: Surgical  
 
Other Topics Concern  None Social History Narrative Prior to Admission medications Medication Sig Start Date End Date Taking? Authorizing Provider  
clonazePAM (KLONOPIN) 1 mg tablet Take 1 mg by mouth two (2) times a day.     Phys Tessa, MD  
methylPREDNISolone (MEDROL, FATOU,) 4 mg tablet Take as package directs 1/13/18   KASSY Triplett  
guaiFENesin-codeine (ROBITUSSIN AC) 100-10 mg/5 mL solution Take 7.5 mL by mouth three (3) times daily as needed for Cough. Max Daily Amount: 22.5 mL. 1/13/18   KASSY Grewal  
BUTALB/ACETAMINOPHEN/CAFFEINE (FIORICET PO) Take  by mouth. Isaac Zarate MD  
HYDROcodone-acetaminophen (NORCO) 5-325 mg per tablet Take 1 Tab by mouth every four (4) hours as needed for Pain. 6/21/14   Lino Martin MD  
fluticasone (FLONASE) 50 mcg/actuation nasal spray 1 [place] by Both Nostrils route daily. 1 spray in each nostril. 4/7/14   KASSY Zapata  
sertraline (ZOLOFT) 100 mg tablet Take  by mouth daily. Isaac Zarate MD  
levothyroxine (SYNTHROID) 150 mcg tablet Take  by mouth Daily (before breakfast). Isaac Zarate MD  
esomeprazole (NEXIUM) 40 mg capsule Take  by mouth daily. Isaac Zarate MD  
ziprasidone (GEODON) 40 mg capsule Take 60 mg by mouth daily. Isaac Zarate MD  
zolpidem (AMBIEN) 10 mg tablet Take  by mouth nightly as needed. sIaac Zarate MD  
 
 
Allergies Allergen Reactions  Sulfa (Sulfonamide Antibiotics) Hives  Imitrex [Sumatriptan Succinate] Other (comments) \"makes headaches worse\" Review of Systems Gen: No fever, chills, malaise, weight loss/gain. Heent: No headache, rhinorrhea, epistaxis, ear pain, hearing loss, sinus pain, neck pain/stiffness, sore throat. Heart: No chest pain, palpitations, BLAS, pnd, or orthopnea. Resp: No cough, hemoptysis, wheezing and shortness of breath. GI: No nausea, vomiting, +diarrhea, no constipation, melena or hematochezia. : No urinary obstruction, dysuria or hematuria. Derm: No rash, new skin lesion or pruritis. Musc/skeletal: no bone or joint complains. Vasc: No edema, cyanosis or claudication. Endo: No heat/cold intolerance, no polyuria,polydipsia or polyphagia. Neuro: No unilateral weakness,. Rt arm  numbness, tingling. No seizures. Slurred speech . Mild headache Heme: No easy bruising or bleeding. Physical Exam:  
 
Physical Exam: 
Visit Vitals  /81 (BP 1 Location: Right arm, BP Patient Position: At rest)  Pulse 67  Temp 98.1 °F (36.7 °C)  Resp 18  Ht 5' 3\" (1.6 m)  Wt 81.6 kg (180 lb)  SpO2 100%  BMI 31.89 kg/m2 O2 Device: Room air Temp (24hrs), Av.2 °F (36.8 °C), Min:98.1 °F (36.7 °C), Max:98.3 °F (36.8 °C) General:  Awake, cooperative, no distress. Head:  Normocephalic, without obvious abnormality, atraumatic. Eyes:  Conjunctivae/corneas clear, sclera anicteric, PERRL, EOMs intact. Nose: Nares normal. No drainage or sinus tenderness. Throat: Lips, mucosa, and tongue normal. .  
Neck: Supple, symmetrical, trachea midline, no adenopathy. Lungs:   Clear to auscultation bilaterally. Heart:  Regular rate and rhythm, S1, S2 normal, no murmur, click, rub or gallop. Abdomen: Soft, non-tender. Bowel sounds normal. No masses,  No organomegaly. Extremities: Extremities normal, atraumatic, no cyanosis or edema. Pulses: 2+ and symmetric all extremities. Skin: Skin color-pink, texture, turgor normal. No rashes or lesions. Capillary refill normal   
Neurologic: CNII-XII intac except slurred speech. No focal motor or sensory deficit Labs Reviewed: 
 
BMP:  
Lab Results Component Value Date/Time  2018 07:55 PM  
 K 3.8 2018 07:55 PM  
  2018 07:55 PM  
 CO2 26 2018 07:55 PM  
 AGAP 8 2018 07:55 PM  
 GLU 75 2018 07:55 PM  
 BUN 7 2018 07:55 PM  
 CREA 0.93 2018 07:55 PM  
 GFRAA >60 2018 07:55 PM  
 GFRNA >60 2018 07:55 PM  
 
CMP:  
Lab Results Component Value Date/Time   2018 07:55 PM  
 K 3.8 2018 07:55 PM  
  2018 07:55 PM  
 CO2 26 2018 07:55 PM  
 AGAP 8 2018 07:55 PM  
 GLU 75 2018 07:55 PM  
 BUN 7 2018 07:55 PM  
 CREA 0.93 2018 07:55 PM  
 GFRAA >60 2018 07:55 PM  
 GFRNA >60 2018 07:55 PM  
 CA 8.3 (L) 09/20/2018 07:55 PM  
 ALB 3.8 09/20/2018 07:55 PM  
 TP 7.1 09/20/2018 07:55 PM  
 GLOB 3.3 09/20/2018 07:55 PM  
 AGRAT 1.2 09/20/2018 07:55 PM  
 SGOT 14 (L) 09/20/2018 07:55 PM  
 ALT 17 09/20/2018 07:55 PM  
 
CBC:  
Lab Results Component Value Date/Time WBC 8.3 09/20/2018 07:55 PM  
 HGB 12.0 09/20/2018 07:55 PM  
 HCT 36.6 09/20/2018 07:55 PM  
  09/20/2018 07:55 PM  
 
All Cardiac Markers in the last 24 hours:  
Lab Results Component Value Date/Time CPK 77 09/20/2018 07:55 PM  
 CKMB <1.0 09/20/2018 07:55 PM  
 CKND1  09/20/2018 07:55 PM  
  CALCULATION NOT PERFORMED WHEN RESULT IS BELOW LINEAR LIMIT  
 TROIQ <0.02 09/20/2018 07:55 PM  
 
Recent Glucose Results:  
Lab Results Component Value Date/Time GLU 75 09/20/2018 07:55 PM  
 
ABG: No results found for: PH, PHI, PCO2, PCO2I, PO2, PO2I, HCO3, HCO3I, FIO2, FIO2I 
COAGS: No results found for: APTT, PTP, INR Liver Panel:  
Lab Results Component Value Date/Time ALB 3.8 09/20/2018 07:55 PM  
 TP 7.1 09/20/2018 07:55 PM  
 GLOB 3.3 09/20/2018 07:55 PM  
 AGRAT 1.2 09/20/2018 07:55 PM  
 SGOT 14 (L) 09/20/2018 07:55 PM  
 ALT 17 09/20/2018 07:55 PM  
 AP 76 09/20/2018 07:55 PM  
 
Pancreatic Markers: No results found for: AMYLPOCT, AML, LIPPOCT, LPSE Procedures/imaging: see electronic medical records for all procedures/Xrays and details which were not copied into this note but were reviewed prior to creation of Plan Vasquez Duarte MD, Internal Medicine CC: Bambi Bernardo MD

## 2018-09-21 NOTE — PROGRESS NOTES
9/21/2018 RE: Veronika Montes To Whom it May Concern: This is to certify that Veronika Montes may may return to work [address] PCP. Please feel free to contact my office if you have any questions or concerns. Thank you for your assistance in this matter.  
 
Sincerely, 
 
 
Tavia Nolan RN

## 2018-09-21 NOTE — DISCHARGE INSTRUCTIONS
DISCHARGE SUMMARY from Nurse    PATIENT INSTRUCTIONS:    After general anesthesia or intravenous sedation, for 24 hours or while taking prescription Narcotics:  · Limit your activities  · Do not drive and operate hazardous machinery  · Do not make important personal or business decisions  · Do  not drink alcoholic beverages  · If you have not urinated within 8 hours after discharge, please contact your surgeon on call. Report the following to your surgeon:  · Excessive pain, swelling, redness or odor of or around the surgical area  · Temperature over 100.5  · Nausea and vomiting lasting longer than 4 hours or if unable to take medications  · Any signs of decreased circulation or nerve impairment to extremity: change in color, persistent  numbness, tingling, coldness or increase pain  · Any questions    What to do at Home:  Recommended activity: Activity as tolerated    If you experience any of the following symptoms INCREASED NUMBNESS, WEAKNESS, please follow up with EMERGENCY ROOM. *  Please give a list of your current medications to your Primary Care Provider. *  Please update this list whenever your medications are discontinued, doses are      changed, or new medications (including over-the-counter products) are added. *  Please carry medication information at all times in case of emergency situations. These are general instructions for a healthy lifestyle:    No smoking/ No tobacco products/ Avoid exposure to second hand smoke  Surgeon General's Warning:  Quitting smoking now greatly reduces serious risk to your health.     Obesity, smoking, and sedentary lifestyle greatly increases your risk for illness    A healthy diet, regular physical exercise & weight monitoring are important for maintaining a healthy lifestyle    You may be retaining fluid if you have a history of heart failure or if you experience any of the following symptoms:  Weight gain of 3 pounds or more overnight or 5 pounds in a week, increased swelling in our hands or feet or shortness of breath while lying flat in bed. Please call your doctor as soon as you notice any of these symptoms; do not wait until your next office visit. Recognize signs and symptoms of STROKE:    F-face looks uneven    A-arms unable to move or move unevenly    S-speech slurred or non-existent    T-time-call 911 as soon as signs and symptoms begin-DO NOT go       Back to bed or wait to see if you get better-TIME IS BRAIN. Warning Signs of HEART ATTACK     Call 911 if you have these symptoms:   Chest discomfort. Most heart attacks involve discomfort in the center of the chest that lasts more than a few minutes, or that goes away and comes back. It can feel like uncomfortable pressure, squeezing, fullness, or pain.  Discomfort in other areas of the upper body. Symptoms can include pain or discomfort in one or both arms, the back, neck, jaw, or stomach.  Shortness of breath with or without chest discomfort.  Other signs may include breaking out in a cold sweat, nausea, or lightheadedness. Don't wait more than five minutes to call 911 - MINUTES MATTER! Fast action can save your life. Calling 911 is almost always the fastest way to get lifesaving treatment. Emergency Medical Services staff can begin treatment when they arrive -- up to an hour sooner than if someone gets to the hospital by car. The discharge information has been reviewed with the patient. The patient verbalized understanding. Discharge medications reviewed with the patient and appropriate educational materials and side effects teaching were provided. Patient armband removed and shredded            9/21/2018      RE: Jr Mcmanus      To Whom it May Concern: This is to certify that Jr Mcmanus may may return to work on [address] PCP. Please feel free to contact my office if you have any questions or concerns.   Thank you for your assistance in this matter. Sincerely,      Shalini Royal RN    Aspirin (By mouth)   Aspirin (AS-pir-in)  Treats pain, fever, and inflammation. May lower risk of heart attack and stroke. Brand Name(s): Ascriptin Regular Strength, Aspergum, Aspir Low, Aspirin Adult Low Dose, Aspirin Low Dose, Kaylin Aspirin Children's, Kaylin Aspirin Regimen, Kaylin Extra Strength, Kaylin Genuine Aspirin, Kaylin Low Dose, Bufferin, Bufferin Low Dose, Durlaza, Ecotrin, Ecpirin   There may be other brand names for this medicine. When This Medicine Should Not Be Used: This medicine is not right for everyone. Do not use it if you had an allergic reaction to aspirin or other NSAIDs, or if you have a history of asthma with nasal polyps and rhinitis. How to Use This Medicine:   Delayed Release Capsule, Long Acting Capsule, Gum, Tablet, Chewable Tablet, Fizzy Tablet, Coated Tablet, Long Acting Tablet, 24 Hour Capsule  · Your doctor will tell you how much medicine to use. Do not use more than directed. · It is best to take this medicine with food or milk. · Capsule, tablet, or coated tablet: Swallow whole. Do not crush, break, or chew it. · Chewable tablet: You may chew it completely or swallow it whole. · Gum: Chew completely to make sure you get as much medicine as possible. Drink a full glass (8 ounces) of water after chewing the gum. · Swallow the extended-release capsule whole. Do not crush, break, or chew it. Take the capsule with a full glass of water at the same time each day. · Follow the instructions on the medicine label if you are using this medicine without a prescription. · Missed dose: If you miss a dose of Durlaza, skip the missed dose and go back to your regular dosing schedule. Do not take extra medicine to make up for a missed dose. · Store the medicine in a closed container at room temperature, away from heat, moisture, and direct light.   Drugs and Foods to Avoid:   Ask your doctor or pharmacist before using any other medicine, including over-the-counter medicines, vitamins, and herbal products. · Some foods and medicines can affect how aspirin works. Tell your doctor if you are using any of the following:  ¨ Dipyridamole, methotrexate, probenecid, sulfinpyrazone, ticlopidine  ¨ Blood thinner (including clopidogrel, prasugrel, ticagrelor, warfarin)  ¨ Blood pressure medicine  ¨ Medicine to treat seizures (including phenytoin, valproic acid)  ¨ NSAID pain or arthritis medicine (including celecoxib, diclofenac, ibuprofen, naproxen)  ¨ Steroid medicine (including dexamethasone, hydrocortisone, methylprednisolone, prednisolone, prednisone)  · Do not take Durlaza 2 hours before or 1 hour after you drink alcohol or take medicines that contain alcohol. Warnings While Using This Medicine:   · Tell your doctor if you are pregnant or breastfeeding. Do not use this medicine during the later part of a pregnancy unless your doctor tells you to. · Tell your doctor if you have kidney disease, liver disease, high blood pressure, heart disease, or a history of stomach bleeding or ulcers. · This medicine may increase your risk for bleeding, including stomach ulcers. · Do not give aspirin to a child or teenager who has chickenpox or flu symptoms, unless the doctor says it is okay. Aspirin can cause a life-threatening reaction called Reye syndrome. · Tell any doctor or dentist who treats you that you are using this medicine. This medicine may affect certain medical test results. · Keep all medicine out of the reach of children. Never share your medicine with anyone.   Possible Side Effects While Using This Medicine:   Call your doctor right away if you notice any of these side effects:  · Allergic reaction: Itching or hives, swelling in your face or hands, swelling or tingling in your mouth or throat, chest tightness, trouble breathing  · Bloody or black stools, bloody vomit or vomit that looks like coffee grounds  · Chest tightness, wheezing  · Ringing in the ears  · Severe stomach pain  · Unusual bleeding, bruising, or weakness  If you notice other side effects that you think are caused by this medicine, tell your doctor. Call your doctor for medical advice about side effects. You may report side effects to FDA at 7-749-FDA-7934  © 2017 [address] [employer] Information is for End User's use only and may not be sold, redistributed or otherwise used for commercial purposes. The above information is an  only. It is not intended as medical advice for individual conditions or treatments. Talk to your doctor, nurse or pharmacist before following any medical regimen to see if it is safe and effective for you. Atorvastatin (By mouth)   Atorvastatin (a-tor-va-STAT-in)  Treats high cholesterol and triglyceride levels. Reduces the risk of angina, stroke, heart attack, or certain heart and blood vessel problems. This medicine is a statin. Brand Name(s): Lipitor   There may be other brand names for this medicine. When This Medicine Should Not Be Used: This medicine is not right for everyone. Do not use it if you had an allergic reaction to atorvastatin, if you have active liver disease, or if you are pregnant or breastfeeding. How to Use This Medicine:   Tablet  · Take your medicine as directed. Your dose may need to be changed several times to find what works best for you. · Take this medicine at the same time each day. · Swallow the tablet whole. Do not break it. · Missed dose: Take a dose as soon as you remember. If it is less than 12 hours until your next dose, skip the missed dose and take the next dose at the regular time. Do not take 2 doses of this medicine within 12 hours. · Read and follow the patient instructions that come with this medicine. Talk to your doctor or pharmacist if you have any questions.   · Store the medicine in a closed container at room temperature, away from heat, moisture, and direct light.  Drugs and Foods to Avoid:   Ask your doctor or pharmacist before using any other medicine, including over-the-counter medicines, vitamins, and herbal products. · Some medicines can affect how atorvastatin works. Tell your doctor if you also use birth control pills, boceprevir, cimetidine, colchicine, cyclosporine, digoxin, niacin, rifampin, spironolactone, telaprevir, medicine to treat an infection, or medicine to treat HIV/AIDS. Warnings While Using This Medicine:   · It is not safe to take this medicine during pregnancy. It could harm an unborn baby. Tell your doctor right away if you become pregnant. · Tell your doctor if you have kidney disease, diabetes, muscle pain or weakness, thyroid problems, have recently had a stroke or TIA (transient ischemic attack), or have a history of liver disease. Tell your doctor if you drink grapefruit juice or drink alcohol regularly. · This medicine can cause muscle problems, which can lead to kidney problems. · Tell any doctor or dentist who treats you that you use this medicine. You may need to stop using it if you have surgery, have an injury, or develop serious health problems. · Your doctor will do lab tests at regular visits to check on the effects of this medicine. Keep all appointments. · Keep all medicine out of the reach of children. Never share your medicine with anyone.   Possible Side Effects While Using This Medicine:   Call your doctor right away if you notice any of these side effects:  · Allergic reaction: Itching or hives, swelling in your face or hands, swelling or tingling in your mouth or throat, chest tightness, trouble breathing  · Blistering, peeling, red skin rash  · Change in how much or how often you urinate  · Dark urine or pale stools, nausea, vomiting, loss of appetite, stomach pain, yellow skin or eyes  · Fever  · Muscle pain, tenderness, or weakness  · Unusual tiredness  If you notice these less serious side effects, talk with your doctor:   · Diarrhea  · Joint pain  If you notice other side effects that you think are caused by this medicine, tell your doctor. Call your doctor for medical advice about side effects. You may report side effects to FDA at 8-903-FDA-1605  © 2017 [address] [employer] Information is for End User's use only and may not be sold, redistributed or otherwise used for commercial purposes. The above information is an  only. It is not intended as medical advice for individual conditions or treatments. Talk to your doctor, nurse or pharmacist before following any medical regimen to see if it is safe and effective for you. Influenza (Flu) Vaccine (Inactivated or Recombinant): What You Need to Know  Why get vaccinated? Influenza (\"flu\") is a contagious disease that spreads around the [place] every winter, usually between October and May. Flu is caused by influenza viruses and is spread mainly by coughing, sneezing, and close contact. Anyone can get flu. Flu strikes suddenly and can last several days. Symptoms vary by age, but can include:  · Fever/chills. · Sore throat. · Muscle aches. · Fatigue. · Cough. · Headache. · Runny or stuffy nose. Flu can also lead to pneumonia and blood infections, and cause diarrhea and seizures in children. If you have a medical condition, such as heart or lung disease, flu can make it worse. Flu is more dangerous for some people. Infants and young children, people 72years of age and older, pregnant women, and people with certain health conditions or a weakened immune system are at greatest risk. Each year thousands of people in the [facility] die from flu, and many more are hospitalized. Flu vaccine can:  · Keep you from getting flu. · Make flu less severe if you do get it. · Keep you from spreading flu to your family and other people. Inactivated and recombinant flu vaccines  A dose of flu vaccine is recommended every flu season. Children 6 months through 6years of age may need two doses during the same flu season. Everyone else needs only one dose each flu season. Some inactivated flu vaccines contain a very small amount of a mercury-based preservative called thimerosal. Studies have not shown thimerosal in vaccines to be harmful, but flu vaccines that do not contain thimerosal are available. There is no live flu virus in flu shots. They cannot cause the flu. There are many flu viruses, and they are always changing. Each year a new flu vaccine is made to protect against three or four viruses that are likely to cause disease in the upcoming flu season. But even when the vaccine doesn't exactly match these viruses, it may still provide some protection. Flu vaccine cannot prevent:  · Flu that is caused by a virus not covered by the vaccine. · Illnesses that look like flu but are not. Some people should not get this vaccine  Tell the person who is giving you the vaccine:  · If you have any severe (life-threatening) allergies. If you ever had a life-threatening allergic reaction after a dose of flu vaccine, or have a severe allergy to any part of this vaccine, you may be advised not to get vaccinated. Most, but not all, types of flu vaccine contain a small amount of egg protein. · If you ever had Guillain-Barré syndrome (also called GBS) Some people with a history of GBS should not get this vaccine. This should be discussed with your doctor. · If you are not feeling well. It is usually okay to get flu vaccine when you have a mild illness, but you might be asked to come back when you feel better. Risks of a vaccine reaction  With any medicine, including vaccines, there is a chance of reactions. These are usually mild and go away on their own, but serious reactions are also possible. Most people who get a flu shot do not have any problems with it.   Minor problems following a flu shot include:  · Soreness, redness, or swelling where the shot was given  · Hoarseness  · Sore, red or itchy eyes  · Cough  · Fever  · Aches  · Headache  · Itching  · Fatigue  If these problems occur, they usually begin soon after the shot and last 1 or 2 days. More serious problems following a flu shot can include the following:  · There may be a small increased risk of Guillain-Barré Syndrome (GBS) after inactivated flu vaccine. This risk has been estimated at 1 or 2 additional cases per million people vaccinated. This is much lower than the risk of severe complications from flu, which can be prevented by flu vaccine. · [employer] children who get the flu shot along with pneumococcal vaccine (PCV13) and/or DTaP vaccine at the same time might be slightly more likely to have a seizure caused by fever. Ask your doctor for more information. Tell your doctor if a child who is getting flu vaccine has ever had a seizure  Problems that could happen after any injected vaccine:  · People sometimes faint after a medical procedure, including vaccination. Sitting or lying down for about 15 minutes can help prevent fainting, and injuries caused by a fall. Tell your doctor if you feel dizzy, or have vision changes or ringing in the ears. · Some people get severe pain in the shoulder and have difficulty moving the arm where a shot was given. This happens very rarely. · Any medication can cause a severe allergic reaction. Such reactions from a vaccine are very rare, estimated at about 1 in a million doses, and would happen within a few minutes to a few hours after the vaccination. As with any medicine, there is a very remote chance of a vaccine causing a serious injury or death. The safety of vaccines is always being monitored. For more information, visit: www.cdc.gov/vaccinesafety/. What if there is a serious reaction? What should I look for? · Look for anything that concerns you, such as signs of a severe allergic reaction, very high fever, or unusual behavior.   Signs of a severe allergic reaction can include hives, swelling of the face and throat, difficulty breathing, a fast heartbeat, dizziness, and weakness - usually within a few minutes to a few hours after the vaccination. What should I do? · If you think it is a severe allergic reaction or other emergency that can't wait, call 9-1-1 and get the person to the nearest hospital. Otherwise, call your doctor. · Reactions should be reported to the \"Vaccine Adverse Event Reporting System\" (VAERS). Your doctor should file this report, or you can do it yourself through the VAERS website at www.vaers. [facility].gov, or by calling 9-122.187.5862. VAERS does not give medical advice. The National Vaccine Injury Compensation Program  The National Vaccine Injury Compensation Program (VICP) is a federal program that was created to compensate people who may have been injured by certain vaccines. Persons who believe they may have been injured by a vaccine can learn about the program and about filing a claim by calling 0-842.725.2801 or visiting the [employer] website at www.[facility].gov/vaccinecompensation. There is a time limit to file a claim for compensation. How can I learn more? · Ask your healthcare provider. He or she can give you the vaccine package insert or suggest other sources of information. · Call your local or state health department. · Contact the Centers for Disease Control and Prevention (CDC):  ¨ Call 2-628.949.2166 (1-800-CDC-INFO) or  ¨ Visit CDC's website at www.cdc.gov/flu  Vaccine Information Statement  Inactivated Influenza Vaccine  8/7/2015)  42 TRACI Jessica Peterswong 447HJ-21  Department of Health and Human Services  Centers for Disease Control and Prevention  Many Vaccine Information Statements are available in U[place] and other languages. See www.immunize.org/vis. Muchas hojas de información sobre vacunas están disponibles en español y en otros idiomas. Visite www.immunize.org/vis.   Care instructions adapted under license by [employer] (which disclaims liability or warranty for this information). If you have questions about a medical condition or this instruction, always ask your healthcare professional. [address] any warranty or liability for your use of this information.

## 2018-09-21 NOTE — PROGRESS NOTES
0450: Pt transferred to ICU. Pt awake and alert. Transferred bed with no difficulty. NIH (score 3) on arrival with Rachel Hendrickson RN. Pt has slight facial droop to right side, weakness/numbness to right arm, drift in right leg. Pt speech slurred with some delay in response. 0500: Assessment documented, see flow sheets. Paged Dr. Toy Mckenna regarding pt complaints of pain. New order received for 0.5 mg morphine IV one time dose. Verbal read back verified. (see MAR). Awaiting on MRI tech to get here to complete order. Consent signed with Pb Li RN. RN communicated MRI tech has been called and on his way. 3310: Re-called Lizeth Fleming for stat MRI. Verified MRI consent list. He states he is on the way, will be about half an hour. 0630: Pt transferred to MRI. Hooked up with MRI compatible monitoring. 8560: MRI started. 0725: Pt transferred back to ICU 10.  
 
0750: Spoke with Dr. Paco Norton and oncoming RN about procedures. Pt not having stroke. Bedside and Verbal shift change report given to LOURDES Conde RN and Liat Mendiola RN (oncoming nurse) by Marilu Palma RN 
 (offgoing nurse). Report included the following information SBAR, Intake/Output, MAR, Recent Results and Cardiac Rhythm NSR.

## 2018-09-21 NOTE — PROGRESS NOTES
Consult received from Dr. Cindy Ojeda. Chart reviewed only. Patient is cleared to go home per neurology. Confirmed with Dr. Cindy Ojeda, consult not needed. Per staff, Dr. Juanito De La Fuente is coming to [place] patient home. Will be available again if need assistance.  
 
Colby Ring MD

## 2018-09-21 NOTE — PROGRESS NOTES
2330: Pt arrived to the unit. Appears to be in stable condition. No acute distress noted. Pt experiencing dysarthria.

## 2018-09-21 NOTE — PROGRESS NOTES
Chart reviewed and PT order acknowledged. Spoke with pt who reports she is ambulating independently and has no skilled PT needs. Also spoke with RN who reports she has been ambulating independently and demonstrated safety with all mobility. Pt preparing to d/c in next few minutes and decline ambulation with PT at this time. Discussed availability of outpatient SLP if and residual dysarthria. Discussed home safety briefly and pt acknowledged. Will discharge PT order at this time. Thank you for consult.

## 2018-09-21 NOTE — PROGRESS NOTES
TRANSFER - IN REPORT: 
 
Verbal report received from YENNIFER Bruce RN (name) on Warren Jackson  being received from [facility] (unit) for change in patient condition(new symptoms of stroke) Report consisted of patients Situation, Background, Assessment and  
Recommendations(SBAR). Information from the following report(s) SBAR, Intake/Output, MAR, Recent Results and Cardiac Rhythm   was reviewed with the receiving nurse. Opportunity for questions and clarification was provided. Assessment completed upon patients arrival to unit and care assumed.

## 2018-09-22 ENCOUNTER — HOSPITAL ENCOUNTER (EMERGENCY)
Age: 43
Discharge: HOME | End: 2018-09-23
Attending: EMERGENCY MEDICINE
Payer: COMMERCIAL

## 2018-09-22 VITALS
WEIGHT: 180 LBS | OXYGEN SATURATION: 100 % | RESPIRATION RATE: 20 BRPM | BODY MASS INDEX: 31.89 KG/M2 | DIASTOLIC BLOOD PRESSURE: 62 MMHG | TEMPERATURE: 98 F | HEART RATE: 77 BPM | HEIGHT: 63 IN | SYSTOLIC BLOOD PRESSURE: 100 MMHG

## 2018-09-22 DIAGNOSIS — G43.909 MIGRAINE WITHOUT STATUS MIGRAINOSUS, NOT INTRACTABLE, UNSPECIFIED MIGRAINE TYPE: Primary | ICD-10-CM

## 2018-09-22 DIAGNOSIS — R47.9 SPEECH DISTURBANCE, UNSPECIFIED TYPE: ICD-10-CM

## 2018-09-22 PROCEDURE — 99284 EMERGENCY DEPT VISIT MOD MDM: CPT

## 2018-09-22 PROCEDURE — 74011250636 HC RX REV CODE- 250/636: Performed by: EMERGENCY MEDICINE

## 2018-09-22 PROCEDURE — 74011250637 HC RX REV CODE- 250/637: Performed by: EMERGENCY MEDICINE

## 2018-09-22 PROCEDURE — 96361 HYDRATE IV INFUSION ADD-ON: CPT

## 2018-09-22 PROCEDURE — 96374 THER/PROPH/DIAG INJ IV PUSH: CPT

## 2018-09-22 RX ORDER — DIPHENHYDRAMINE HCL 25 MG
50 CAPSULE ORAL
Status: COMPLETED | OUTPATIENT
Start: 2018-09-22 | End: 2018-09-22

## 2018-09-22 RX ORDER — PROCHLORPERAZINE EDISYLATE 5 MG/ML
10 INJECTION INTRAMUSCULAR; INTRAVENOUS
Status: COMPLETED | OUTPATIENT
Start: 2018-09-22 | End: 2018-09-22

## 2018-09-22 RX ADMIN — DIPHENHYDRAMINE HYDROCHLORIDE 50 MG: 25 CAPSULE ORAL at 23:31

## 2018-09-22 RX ADMIN — SODIUM CHLORIDE 1000 ML: 900 INJECTION, SOLUTION INTRAVENOUS at 23:32

## 2018-09-22 RX ADMIN — PROCHLORPERAZINE EDISYLATE 10 MG: 5 INJECTION INTRAMUSCULAR; INTRAVENOUS at 23:31

## 2018-09-23 PROCEDURE — 96361 HYDRATE IV INFUSION ADD-ON: CPT

## 2018-09-23 RX ORDER — DIPHENHYDRAMINE HCL 25 MG
CAPSULE ORAL
Qty: 24 CAP | Refills: 0 | Status: SHIPPED | OUTPATIENT
Start: 2018-09-23

## 2018-09-23 RX ORDER — PROCHLORPERAZINE MALEATE 10 MG
10 TABLET ORAL
Qty: 20 TAB | Refills: 1 | Status: SHIPPED | OUTPATIENT
Start: 2018-09-23 | End: 2018-09-30

## 2018-09-23 NOTE — ED NOTES
Verbal shift change report given to RN Lakshmi (oncoming nurse) by Catalina Arredondo (offgoing nurse). Report included the following information SBAR, ED Summary and MAR.

## 2018-09-23 NOTE — DISCHARGE INSTRUCTIONS
Migraine Headache: Care Instructions  Your Care Instructions  Migraines are painful, throbbing headaches that often start on one side of the head. They may cause nausea and vomiting and make you sensitive to light, sound, or smell. Without treatment, migraines can last from 4 hours to a few days. Medicines can help prevent migraines or stop them after they have started. Your doctor can help you find which ones work best for you. Follow-up care is a key part of your treatment and safety. Be sure to make and go to all appointments, and call your doctor if you are having problems. It's also a good idea to know your test results and keep a list of the medicines you take. How can you care for yourself at home? · Do not drive if you have taken a prescription pain medicine. · Rest in a quiet, dark room until your headache is gone. Close your eyes, and try to relax or go to sleep. Don't watch TV or read. · Put a cold, moist cloth or cold pack on the painful area for 10 to 20 minutes at a time. Put a thin cloth between the cold pack and your skin. · Use a warm, moist towel or a heating pad set on low to relax tight shoulder and neck muscles. · Have someone gently massage your neck and shoulders. · Take your medicines exactly as prescribed. Call your doctor if you think you are having a problem with your medicine. You will get more details on the specific medicines your doctor prescribes. · Be careful not to take pain medicine more often than the instructions allow. You could get worse or more frequent headaches when the medicine wears off. To prevent migraines  · Keep a headache diary so you can figure out what triggers your headaches. Avoiding triggers may help you prevent headaches. Record when each headache began, how long it lasted, and what the pain was like.  (Was it throbbing, aching, stabbing, or dull?) Write down any other symptoms you had with the headache, such as nausea, flashing lights or dark spots, or sensitivity to bright light or loud noise. Note if the headache occurred near your period. List anything that might have triggered the headache. Triggers may include certain foods (chocolate, cheese, wine) or odors, smoke, bright light, stress, or lack of sleep. · If your doctor has prescribed medicine for your migraines, take it as directed. You may have medicine that you take only when you get a migraine and medicine that you take all the time to help prevent migraines. ¨ If your doctor has prescribed medicine for when you get a headache, take it at the first sign of a migraine, unless your doctor has given you other instructions. ¨ If your doctor has prescribed medicine to prevent migraines, take it exactly as prescribed. Call your doctor if you think you are having a problem with your medicine. · Find healthy ways to deal with stress. Migraines are most common during or right after stressful times. Take time to relax before and after you do something that has caused a migraine in the past.  · Try to keep your muscles relaxed by keeping good posture. Check your jaw, face, neck, and shoulder muscles for tension. Try to relax them. When you sit at a desk, change positions often. And make sure to stretch for 30 seconds each hour. · Get plenty of sleep and exercise. · Eat meals on a regular schedule. Avoid foods and drinks that often trigger migraines. These include chocolate, alcohol (especially red wine and port), aspartame, monosodium glutamate (MSG), and some additives found in foods (such as hot dogs, deal, cold cuts, aged cheeses, and pickled foods). · Limit caffeine. Don't drink too much coffee, tea, or soda. But don't quit caffeine suddenly. That can also give you migraines. · Do not smoke or allow others to smoke around you. If you need help quitting, talk to your doctor about stop-smoking programs and medicines. These can increase your chances of quitting for good.   · If you are taking birth control pills or hormone therapy, talk to your doctor about whether they are triggering your migraines. When should you call for help? Call 911 anytime you think you may need emergency care. For example, call if:    · You have signs of a stroke. These may include:  ¨ Sudden numbness, paralysis, or weakness in your face, arm, or leg, especially on only one side of your body. ¨ Sudden vision changes. ¨ Sudden trouble speaking. ¨ Sudden confusion or trouble understanding simple statements. ¨ Sudden problems with walking or balance. ¨ A sudden, severe headache that is different from past headaches.    Call your doctor now or seek immediate medical care if:    · You have new or worse nausea and vomiting.     · You have a new or higher fever.     · Your headache gets much worse.    Watch closely for changes in your health, and be sure to contact your doctor if:    · You are not getting better after 2 days (48 hours). Where can you learn more? Go to http://aury-cruz.info/. Enter D278 in the search box to learn more about \"Migraine Headache: Care Instructions. \"  Current as of: October 9, 2017  Content Version: 11.7  © 3266-9402 [employer]. Care instructions adapted under license by [employer] (which disclaims liability or warranty for this information). If you have questions about a medical condition or this instruction, always ask your healthcare professional. [address] any warranty or liability for your use of this information.

## 2018-09-23 NOTE — ED NOTES
I have reviewed discharge instructions with the patient and spouse. The patient and spouse verbalized understanding. Patient armband removed and shredded. Pt removed all belongings pt ambulated without distress or discomfort.

## 2018-09-23 NOTE — ED TRIAGE NOTES
Patient states that she was seen here on Thursday for a stroke and discharged yesterday. Patient with complaints of a headache since Thursday and states she feels like her whole body is shutting down.

## 2018-09-23 NOTE — ED PROVIDER NOTES
EMERGENCY DEPARTMENT HISTORY AND PHYSICAL EXAM 
 
Date: 9/22/2018 Patient Name: Geovanna Peterson History of Presenting Illness Chief Complaint Patient presents with  
 Headache History Provided By: Patient Chief Complaint: HA 
Duration: 6 Days Timing:  Worsening Location: Forehead Quality: Aching and Throbbing Severity: 10 out of 10 Modifying Factors: No modifying factors Associated Symptoms: visual disturbance (straining), nausea Additional History (Context):  
9:59 PM 
Geovanna Peterson is a 38 y.o. female with PMHX of IBS,Hypothyroidism, COPD, who presents to the emergency department C/O waxing and waning HA onset 6 days ago. Associated sxs include visual disturbance (straining), nausea. Pt states that she was seen here in the ED 4 days ago and dx with a stroke. Pt states that the HA worsened through her stay at [facility] and states that she was discharged 1 day ago. Pt endorses tobacco use. Pt states she has a hx of strokes and brain tumors. Pt denies fever, and any other sxs or complaints. PCP: Fátima Conde MD 
 
Current Facility-Administered Medications Medication Dose Route Frequency Provider Last Rate Last Dose  sodium chloride 0.9 % bolus infusion 1,000 mL  1,000 mL IntraVENous ONCE Mario Callahan MD 1,000 mL/hr at 09/22/18 2332 1,000 mL at 09/22/18 2332 Current Outpatient Prescriptions Medication Sig Dispense Refill  prochlorperazine (COMPAZINE) 10 mg tablet Take 1 Tab by mouth every eight (8) hours as needed for Nausea for up to 7 days. 20 Tab 1  
 diphenhydrAMINE (BENADRYL) 25 mg capsule Take 1-2 tabs every 6 hours with Compazine for headaches as needed. 24 Cap 0  
 aspirin 81 mg chewable tablet Take 1 Tab by mouth daily. 30 Tab 0  
 atorvastatin (LIPITOR) 20 mg tablet Take 1 Tab by mouth daily.  30 Tab 0  
 codeine-butalbital-acetaminophen-caffeine (FIORICET WITH CODEINE) -46-30 mg capsule Take 2 Caps by mouth every four (4) hours as needed for Headache.  clonazePAM (KLONOPIN) 1 mg tablet Take 1 mg by mouth two (2) times a day.  HYDROcodone-acetaminophen (NORCO) 5-325 mg per tablet Take 1 Tab by mouth every four (4) hours as needed for Pain. 20 Tab 0  
 fluticasone (FLONASE) 50 mcg/actuation nasal spray 1 [place] by Both Nostrils route daily. 1 spray in each nostril. 1 Bottle 0  
 sertraline (ZOLOFT) 100 mg tablet Take 200 mg by mouth daily.  levothyroxine (SYNTHROID) 150 mcg tablet Take  by mouth Daily (before breakfast).  esomeprazole (NEXIUM) 40 mg capsule Take  by mouth daily.  ziprasidone (GEODON) 40 mg capsule Take 120 mg by mouth nightly. Indications: BIPOLAR DISORDER IN REMISSION    
 zolpidem (AMBIEN) 10 mg tablet Take  by mouth nightly as needed. Past History Past Medical History: 
Past Medical History:  
Diagnosis Date  Bipolar affective ([facility]  Chronic obstructive pulmonary disease ([facility]  Depression  High cholesterol  Hypothyroidism  IBS (irritable bowel syndrome)  Migraine  Other ill-defined conditions(799.89) frequent UTI's  UTI (urinary tract infection) Past Surgical History: 
Past Surgical History:  
Procedure Laterality Date  HX CHOLECYSTECTOMY  HX GYN    
 tubal ligation  HX HEENT    
 tonsillectomy Family History: 
History reviewed. No pertinent family history. Social History: 
Social History Substance Use Topics  Smoking status: Current Every Day Smoker Packs/day: 0.50  Smokeless tobacco: Never Used  Alcohol use Yes Comment: rarely Allergies: Allergies Allergen Reactions  Sulfa (Sulfonamide Antibiotics) Hives  Imitrex [Sumatriptan Succinate] Other (comments) \"makes headaches worse\" Review of Systems Review of Systems Constitutional: Negative for fever. Eyes: Positive for visual disturbance (straining). Gastrointestinal: Positive for nausea. Neurological: Positive for headaches. All other systems reviewed and are negative. Physical Exam  
 
Vitals:  
 09/22/18 2047 BP: 100/62 Pulse: 77 Resp: 20 Temp: 98 °F (36.7 °C) SpO2: 100% Weight: 81.6 kg (180 lb) Height: 5' 3\" (1.6 m) Physical Exam  
Constitutional: She is oriented to person, place, and time. She appears well-developed and well-nourished. No distress. Slow deliberate speech without slurring HENT:  
Head: Normocephalic and atraumatic. Right Ear: External ear normal.  
Left Ear: External ear normal.  
Mouth/Throat: Oropharynx is clear and moist. No oropharyngeal exudate. Eyes: Conjunctivae and EOM are normal. Pupils are equal, round, and reactive to light. No scleral icterus. No pallor Neck: Normal range of motion. Neck supple. No JVD present. No tracheal deviation present. No thyromegaly present. Cardiovascular: Normal rate, regular rhythm and normal heart sounds. Pulmonary/Chest: Effort normal and breath sounds normal. No stridor. No respiratory distress. Abdominal: Soft. Bowel sounds are normal. She exhibits no distension. There is no tenderness. There is no rebound and no guarding. Musculoskeletal: Normal range of motion. She exhibits no edema or tenderness. No soft tissue injuries Lymphadenopathy:  
  She has no cervical adenopathy. Neurological: She is alert and oriented to person, place, and time. She has normal reflexes. No cranial nerve deficit. Coordination normal.  
No pronator drift Normal fine motor coordination Skin: Skin is warm and dry. No rash noted. She is not diaphoretic. No erythema. Psychiatric: She has a normal mood and affect. Her behavior is normal. Judgment and thought content normal.  
Nursing note and vitals reviewed. Diagnostic Study Results Labs - No results found for this or any previous visit (from the past 12 hour(s)). Radiologic Studies - No orders to display CT Results  (Last 48 hours) 09/21/18 0449  CT HEAD WO CONT Final result Impression:  IMPRESSION:  
   
No acute intracranial abnormalities. Narrative:  EXAM: CT head INDICATION: Weakness. COMPARISON: September 20, 2018. TECHNIQUE: Axial CT imaging of the head was performed without intravenous  
contrast. Dose reduction techniques used: automated exposure control, adjustment  
of the mAs and/or kVp according to patient size, and iterative reconstruction  
techniques. _______________ FINDINGS:  
   
BRAIN AND POSTERIOR FOSSA: The sulci, folia, ventricles and basal cisterns are  
within normal limits for the patient's age. There is no intracranial hemorrhage,  
mass effect, or midline shift. There are no areas of abnormal parenchymal  
attenuation. EXTRA-AXIAL SPACES AND MENINGES: There are no abnormal extra-axial fluid  
collections. CALVARIUM: Intact. SINUSES: Clear. OTHER: None.  
   
_______________ CXR Results  (Last 48 hours) None Medications given in the ED- Medications  
sodium chloride 0.9 % bolus infusion 1,000 mL (1,000 mL IntraVENous New Bag 9/22/18 2332) prochlorperazine (COMPAZINE) injection 10 mg (10 mg IntraVENous Given 9/22/18 2331) diphenhydrAMINE (BENADRYL) capsule 50 mg (50 mg Oral Given 9/22/18 2331) Medical Decision Making I am the first provider for this patient. I reviewed the vital signs, available nursing notes, past medical history, past surgical history, family history and social history. Vital Signs-Reviewed the patient's vital signs. Pulse Oximetry Analysis - 100% on RA Records Reviewed: Nursing Notes Provider Notes (Medical Decision Making):  
Ddx: HA is likely a migraine possibly stroke related unlikely to be new bleed but will review records as far as previous treatments and plans. Procedures: 
Procedures ED Course: 9:59 PM Initial assessment performed. The patients presenting problems have been discussed, and they are in agreement with the care plan formulated and outlined with them. I have encouraged them to ask questions as they arise throughout their visit. 11:14 PM Review of records do not show stroke but migraine HA. Diagnosis and Disposition DISCHARGE NOTE: 
12:10 AM 
Aguilar Krause's  results have been reviewed with her. She has been counseled regarding her diagnosis, treatment, and plan. She verbally conveys understanding and agreement of the signs, symptoms, diagnosis, treatment and prognosis and additionally agrees to follow up as discussed. She also agrees with the care-plan and conveys that all of her questions have been answered. I have also provided discharge instructions for her that include: educational information regarding their diagnosis and treatment, and list of reasons why they would want to return to the ED prior to their follow-up appointment, should her condition change. She has been provided with education for proper emergency department utilization. CLINICAL IMPRESSION: 
 
1. Migraine without status migrainosus, not intractable, unspecified migraine type 2. Speech disturbance, unspecified type PLAN: 
1. D/C Home 2. Current Discharge Medication List  
  
START taking these medications Details  
prochlorperazine (COMPAZINE) 10 mg tablet Take 1 Tab by mouth every eight (8) hours as needed for Nausea for up to 7 days. Qty: 20 Tab, Refills: 1 diphenhydrAMINE (BENADRYL) 25 mg capsule Take 1-2 tabs every 6 hours with Compazine for headaches as needed. Qty: 24 Cap, Refills: 0 CONTINUE these medications which have NOT CHANGED Details  
aspirin 81 mg chewable tablet Take 1 Tab by mouth daily. Qty: 30 Tab, Refills: 0  
  
atorvastatin (LIPITOR) 20 mg tablet Take 1 Tab by mouth daily. Qty: 30 Tab, Refills: 0 codeine-butalbital-acetaminophen-caffeine (FIORICET WITH CODEINE) -03-30 mg capsule Take 2 Caps by mouth every four (4) hours as needed for Headache.  
  
clonazePAM (KLONOPIN) 1 mg tablet Take 1 mg by mouth two (2) times a day. HYDROcodone-acetaminophen (NORCO) 5-325 mg per tablet Take 1 Tab by mouth every four (4) hours as needed for Pain. Qty: 20 Tab, Refills: 0  
  
fluticasone (FLONASE) 50 mcg/actuation nasal spray 1 [place] by Both Nostrils route daily. 1 spray in each nostril. Qty: 1 Bottle, Refills: 0  
  
sertraline (ZOLOFT) 100 mg tablet Take 200 mg by mouth daily. levothyroxine (SYNTHROID) 150 mcg tablet Take  by mouth Daily (before breakfast). esomeprazole (NEXIUM) 40 mg capsule Take  by mouth daily. ziprasidone (GEODON) 40 mg capsule Take 120 mg by mouth nightly. Indications: BIPOLAR DISORDER IN REMISSION  
  
zolpidem (AMBIEN) 10 mg tablet Take  by mouth nightly as needed. 3.  
Follow-up Information Follow up With Details Comments Contact Info Antony Chacon MD Schedule an appointment as soon as possible for a visit in 2 days For follow up with neurologist [address] [place] [address] [address] [place] [address] 
[phone] [facility] EMERGENCY DEPT Go to As needed, if symptoms worsen [address] [place] [id number] 
[phone]  
  
 
_______________________________ Attestations: This note is prepared by [facility], acting as Scribe for Florencia Julian. Rajwinder Lambert MD. Florencia Julian. Rajwinder Lambert MD:  The scribe's documentation has been prepared under my direction and personally reviewed by me in its entirety. I confirm that the note above accurately reflects all work, treatment, procedures, and medical decision making performed by me. 
_______________________________

## 2018-09-28 LAB
AT III ACT/NOR PPP CHRO: 113 %
COMMENTS: NORMAL
F5 GENE MUT ANL BLD/T: NORMAL
NARRATIVE DIAGNOSTIC REPORT-IMP: NORMAL
PROT C ACT/NOR PPP CHRO: 172 %
PROTEIN S ACTIVITY (CLOTTABLE) 500606: 78 %
REF LAB TEST METHOD: NORMAL

## 2018-10-14 LAB
ATRIAL RATE: 51 BPM
CALCULATED P AXIS, ECG09: 45 DEGREES
CALCULATED R AXIS, ECG10: 70 DEGREES
CALCULATED T AXIS, ECG11: 57 DEGREES
DIAGNOSIS, 93000: NORMAL
P-R INTERVAL, ECG05: 146 MS
Q-T INTERVAL, ECG07: 482 MS
QRS DURATION, ECG06: 88 MS
QTC CALCULATION (BEZET), ECG08: 444 MS
VENTRICULAR RATE, ECG03: 51 BPM

## 2018-10-26 ENCOUNTER — HOSPITAL ENCOUNTER (EMERGENCY)
Age: 43
Discharge: HOME | End: 2018-10-27
Attending: EMERGENCY MEDICINE
Payer: COMMERCIAL

## 2018-10-26 ENCOUNTER — APPOINTMENT (OUTPATIENT)
Dept: GENERAL RADIOLOGY | Age: 43
End: 2018-10-26
Attending: EMERGENCY MEDICINE
Payer: COMMERCIAL

## 2018-10-26 VITALS
WEIGHT: 172 LBS | OXYGEN SATURATION: 100 % | BODY MASS INDEX: 30.48 KG/M2 | HEIGHT: 63 IN | DIASTOLIC BLOOD PRESSURE: 50 MMHG | SYSTOLIC BLOOD PRESSURE: 126 MMHG | RESPIRATION RATE: 14 BRPM | HEART RATE: 75 BPM | TEMPERATURE: 97.1 F

## 2018-10-26 DIAGNOSIS — M79.89 PAIN AND SWELLING OF LEFT LOWER EXTREMITY: ICD-10-CM

## 2018-10-26 DIAGNOSIS — S99.912A INJURY OF LEFT ANKLE, INITIAL ENCOUNTER: ICD-10-CM

## 2018-10-26 DIAGNOSIS — M79.605 PAIN AND SWELLING OF LEFT LOWER EXTREMITY: ICD-10-CM

## 2018-10-26 DIAGNOSIS — S82.839A AVULSION FRACTURE OF DISTAL FIBULA: Primary | ICD-10-CM

## 2018-10-26 PROCEDURE — 73630 X-RAY EXAM OF FOOT: CPT

## 2018-10-26 PROCEDURE — 75810000053 HC SPLINT APPLICATION

## 2018-10-26 PROCEDURE — 73610 X-RAY EXAM OF ANKLE: CPT

## 2018-10-26 PROCEDURE — 99283 EMERGENCY DEPT VISIT LOW MDM: CPT

## 2018-10-26 RX ORDER — HYDROCODONE BITARTRATE AND ACETAMINOPHEN 5; 325 MG/1; MG/1
1 TABLET ORAL
Status: COMPLETED | OUTPATIENT
Start: 2018-10-26 | End: 2018-10-27

## 2018-10-26 NOTE — LETTER
NOTIFICATION RETURN TO WORK / SCHOOL 
 
10/27/2018 12:03 PM 
 
Ms. Farideh Larsen [address] [address] To Whom It May Concern: 
 
Farideh Larsen is currently under the care of [facility] EMERGENCY DEPT. She will return to work once cleared by orthopedist 
 
If there are questions or concerns please have the patient contact our office. Sincerely, 
 
Idalmis Taylor M.D

## 2018-10-27 PROCEDURE — 74011250637 HC RX REV CODE- 250/637: Performed by: PHYSICIAN ASSISTANT

## 2018-10-27 PROCEDURE — 75810000053 HC SPLINT APPLICATION

## 2018-10-27 RX ORDER — IBUPROFEN 600 MG/1
600 TABLET ORAL
Qty: 20 TAB | Refills: 0 | Status: SHIPPED | OUTPATIENT
Start: 2018-10-27

## 2018-10-27 RX ORDER — HYDROCODONE BITARTRATE AND ACETAMINOPHEN 5; 325 MG/1; MG/1
1 TABLET ORAL
Qty: 8 TAB | Refills: 0 | Status: SHIPPED | OUTPATIENT
Start: 2018-10-27

## 2018-10-27 RX ADMIN — HYDROCODONE BITARTRATE AND ACETAMINOPHEN 1 TABLET: 5; 325 TABLET ORAL at 00:18

## 2018-10-27 NOTE — ED NOTES
I have reviewed discharge instructions with the patient. The patient verbalized understanding. Patient armband removed and shredded Patient d/c home in stable condition with crutches. No distress noted.

## 2018-10-27 NOTE — ED PROVIDER NOTES
EMERGENCY DEPARTMENT HISTORY AND PHYSICAL EXAM 
 
Date: 10/26/2018 Patient Name: Rama Rodrigues History of Presenting Illness Chief Complaint Patient presents with  Foot Injury History Provided By: Patient Chief Complaint: foot pain Duration: 1.5 Hours Timing:  Acute Location: left Severity: 7 out of 10 Associated Symptoms: left ankle pain Additional History (Context):  
11:34 PM 
Rama Rodrigues is a 38 y.o. female who presents to the emergency department C/O left foot pain onset 1.5 hours ago s/p mechanical fall. Associated symptoms include left ankle pain. Reports she was walking, inverted the left foot, and fell. Denies LOC. Pt denies fever, left knee pain, and any other Sx or complaints. PCP: Lashawn Smallwood MD 
 
Current Outpatient Medications Medication Sig Dispense Refill  ibuprofen (MOTRIN) 600 mg tablet Take 1 Tab by mouth every six (6) hours as needed for Pain. 20 Tab 0  
 HYDROcodone-acetaminophen (NORCO) 5-325 mg per tablet Take 1 Tab by mouth every four (4) hours as needed for Pain. Max Daily Amount: 6 Tabs. 8 Tab 0  
 aspirin 81 mg chewable tablet Take 1 Tab by mouth daily. 30 Tab 0  
 atorvastatin (LIPITOR) 20 mg tablet Take 1 Tab by mouth daily. 30 Tab 0  clonazePAM (KLONOPIN) 1 mg tablet Take 1 mg by mouth two (2) times a day.  sertraline (ZOLOFT) 100 mg tablet Take 200 mg by mouth daily.  levothyroxine (SYNTHROID) 150 mcg tablet Take  by mouth Daily (before breakfast).  esomeprazole (NEXIUM) 40 mg capsule Take  by mouth daily.  ziprasidone (GEODON) 40 mg capsule Take 120 mg by mouth nightly. Indications: BIPOLAR DISORDER IN REMISSION    
 zolpidem (AMBIEN) 10 mg tablet Take  by mouth nightly as needed.  diphenhydrAMINE (BENADRYL) 25 mg capsule Take 1-2 tabs every 6 hours with Compazine for headaches as needed.  24 Cap 0  
 fluticasone (FLONASE) 50 mcg/actuation nasal spray 1 Spray by Both Nostrils route daily. 1 spray in each nostril. 1 Bottle 0 Past History Past Medical History: 
Past Medical History:  
Diagnosis Date  Bipolar affective ([address]  Chronic obstructive pulmonary disease ([address]  Depression  High cholesterol  Hypothyroidism  IBS (irritable bowel syndrome)  Migraine  Other ill-defined conditions(799.89) frequent UTI's  UTI (urinary tract infection) Past Surgical History: 
Past Surgical History:  
Procedure Laterality Date  HX CHOLECYSTECTOMY  HX GYN    
 tubal ligation  HX HEENT    
 tonsillectomy Family History: 
History reviewed. No pertinent family history. Social History: 
Social History Tobacco Use  Smoking status: Current Every Day Smoker Packs/day: 0.50  Smokeless tobacco: Never Used Substance Use Topics  Alcohol use: Yes Comment: rarely  Drug use: No  
 
 
Allergies: Allergies Allergen Reactions  Sulfa (Sulfonamide Antibiotics) Hives  Imitrex [Sumatriptan Succinate] Other (comments) \"makes headaches worse\" Review of Systems Review of Systems Constitutional: Negative for fever. Musculoskeletal: Positive for arthralgias and myalgias. All other systems reviewed and are negative. Physical Exam  
 
Vitals:  
 10/26/18 2317 BP: 126/50 Pulse: 75 Resp: 14 Temp: 97.1 °F (36.2 °C) SpO2: 100% Weight: 78 kg (172 lb) Height: 5' 3\" (1.6 m) Physical Exam  
Constitutional: She appears well-developed and well-nourished. No distress. HENT:  
Head: Normocephalic and atraumatic. Right Ear: External ear normal.  
Left Ear: External ear normal.  
Nose: Nose normal.  
Eyes: Conjunctivae are normal.  
Neck: Normal range of motion. Cardiovascular: Normal rate and regular rhythm. Pulmonary/Chest: Effort normal. No respiratory distress.   
Musculoskeletal:  
TTP of the distal fibula with edema noted and tenderness to the lateral aspect of the left foot. No obvious deformity or skin breaks appreciated. Normal ROM. NVI distally. Neurological: She is alert. Skin: Skin is warm and dry. She is not diaphoretic. Psychiatric: She has a normal mood and affect. Nursing note and vitals reviewed. Diagnostic Study Results Labs - No results found for this or any previous visit (from the past 12 hour(s)). Radiologic Studies -  
XR FOOT LT MIN 3 V    (Results Pending) XR ANKLE LT MIN 3 V    (Results Pending) 12:17 AM 
RADIOLOGY FINDINGS Lt Ankle X-ray appears to be avulsion fracture of distal fibula Pending review by Radiologist 
Recorded by Imani Del Castillo, ED Scribe, as dictated by Kamilah Nguyen PA-C 
 
12:17 AM 
RADIOLOGY FINDINGS Lt Foot X-ray shows NAP Pending review by Radiologist 
Recorded by Imani Del Castillo, ED Scribe, as dictated by Kamilah Nguyen PA-C 
 
CT Results  (Last 48 hours) None CXR Results  (Last 48 hours) None Medications given in the ED- Medications HYDROcodone-acetaminophen (NORCO) 5-325 mg per tablet 1 Tab (1 Tab Oral Given 10/27/18 0018) Medical Decision Making I am the first provider for this patient. I reviewed the vital signs, available nursing notes, past medical history, past surgical history, family history and social history. Vital Signs-Reviewed the patient's vital signs. Pulse Oximetry Analysis - 100% on RA Records Reviewed: Nursing Notes and Old Medical Records Provider Notes (Medical Decision Making): Appears well and non-toxic, presenting with left ankle pain. Xray with possible avulsion of distal fib. Will splint, give crutches, refer to ortho. Based on today's assessment, I feel the patient is stable for discharge to home with outpatient follow up. Return precautions and referrals provided. Procedures: 
Procedures Procedure Note - Splint Assessement: 
1:09 AM 
Performed by: Kamilah Nguyen PA-C 
 Posterior short leg splint to left leg assessed. Neurovascularly intact. The procedure took 1-15 minutes, and pt tolerated well. Written by Fidelia Mahajan, ED Scribe, as dictated by Sherry Baez PA-C. 
 
 
ED Course:  
11:34 PM Initial assessment performed. The patients presenting problems have been discussed, and they are in agreement with the care plan formulated and outlined with them. I have encouraged them to ask questions as they arise throughout their visit. Diagnosis and Disposition DISCHARGE NOTE: 
1:14 AM 
Brielle Krause's  results have been reviewed with her. She has been counseled regarding her diagnosis, treatment, and plan. She verbally conveys understanding and agreement of the signs, symptoms, diagnosis, treatment and prognosis and additionally agrees to follow up as discussed. She also agrees with the care-plan and conveys that all of her questions have been answered. I have also provided discharge instructions for her that include: educational information regarding their diagnosis and treatment, and list of reasons why they would want to return to the ED prior to their follow-up appointment, should her condition change. She has been provided with education for proper emergency department utilization. CLINICAL IMPRESSION: 
 
1. Avulsion fracture of distal fibula 2. Injury of left ankle, initial encounter 3. Pain and swelling of left lower extremity PLAN: 
1. D/C Home 2. Current Discharge Medication List  
  
START taking these medications Details  
ibuprofen (MOTRIN) 600 mg tablet Take 1 Tab by mouth every six (6) hours as needed for Pain. Qty: 20 Tab, Refills: 0 HYDROcodone-acetaminophen (NORCO) 5-325 mg per tablet Take 1 Tab by mouth every four (4) hours as needed for Pain. Max Daily Amount: 6 Tabs. Qty: 8 Tab, Refills: 0 Associated Diagnoses: Avulsion fracture of distal fibula 3. Follow-up Information Follow up With Specialties Details Why Contact Info Freya Stanley MD Orthopedic Surgery Schedule an appointment as soon as possible for a visit For follow up with orthopedics [address] [place] [address] 
[phone] [facility] EMERGENCY DEPT Emergency Medicine Go to As needed, as symptoms worsen [address] [place] [address] [phone]  
  
 
_______________________________ Attestations: This note is prepared by Alicia Wu, acting as Scribe for Cornell Stephens PA-C. Cornell Stephens PA-C:  The scribe's documentation has been prepared under my direction and personally reviewed by me in its entirety. I confirm that the note above accurately reflects all work, treatment, procedures, and medical decision making performed by me. 
_______________________________

## 2018-10-27 NOTE — ED TRIAGE NOTES
Twisted LT foot/ankle while walking. Swelling/bruising observed. Pt able to ambulate onto her LT foot but painful. Happen 1 hr ago.

## 2018-10-27 NOTE — DISCHARGE INSTRUCTIONS
Please return to the ER for worsening pain or tenderness especially when the muscle is stretched, numbness, weakness, tingling or burning, if the affected area feels tight or full, or if the affected area turns pale or cold. Broken Lower Leg: Care Instructions  Your Care Instructions    Treatment for your broken leg will depend on how bad the break is. Your doctor may have put your lower leg in a splint or a cast to allow it to heal or keep it stable until you see another doctor. It may take weeks or months for your leg to heal. You can help it heal with some care at home. You heal best when you take good care of yourself. Eat a variety of healthy foods, and don't smoke. Follow-up care is a key part of your treatment and safety. Be sure to make and go to all appointments, and call your doctor if you are having problems. It's also a good idea to know your test results and keep a list of the medicines you take. How can you care for yourself at home? · Put ice or a cold pack on your lower leg for 10 to 20 minutes at a time. Try to do this every 1 to 2 hours for the next 3 days (when you are awake). Put a thin cloth between the ice and your cast or splint. Keep your cast or splint dry. · Follow the cast care instructions your doctor gives you. If you have a splint, do not take it off unless your doctor tells you to. · Be safe with medicines. Take pain medicines exactly as directed. ? If the doctor gave you a prescription medicine for pain, take it as prescribed. ? If you are not taking a prescription pain medicine, ask your doctor if you can take an over-the-counter medicine. · Do not put weight on your leg unless your doctor tells you to. Use crutches to walk. · Prop up your leg on pillows when you sit or lie down in the first few days after the injury. Keep your leg higher than the level of your heart. This will help reduce swelling.   · Follow instructions for exercises to keep your leg strong. · Wiggle your toes often to reduce swelling and stiffness. When should you call for help? Call 911 anytime you think you may need emergency care. For example, call if:    · You have chest pain, are short of breath, or you cough up blood.     · You are very sleepy and you have trouble waking up.    Call your doctor now or seek immediate medical care if:    · You have new or worse nausea or vomiting.     · You have new or worse pain.     · Your foot is cool or pale or changes color.     · You have tingling, weakness, or numbness in your toes.     · Your cast or splint feels too tight.     · You have signs of a blood clot in your leg (called a deep vein thrombosis), such as:  ? Pain in your calf, back of the knee, thigh, or groin. ? Redness or swelling in your leg.    Watch closely for changes in your health, and be sure to contact your doctor if:    · You have a problem with your splint or cast.     · You do not get better as expected. Where can you learn more? Go to http://aury-cruz.info/. Enter L198 in the search box to learn more about \"Broken Lower Leg: Care Instructions. \"  Current as of: November 29, 2017  Content Version: 11.8  © 4677-9836 Healthwise, Incorporated. Care instructions adapted under license by [employer] (which disclaims liability or warranty for this information). If you have questions about a medical condition or this instruction, always ask your healthcare professional. [place] [address] any warranty or liability for your use of this information.

## 2018-12-06 ENCOUNTER — HOSPITAL ENCOUNTER (EMERGENCY)
Age: 43
Discharge: HOME | End: 2018-12-06
Attending: EMERGENCY MEDICINE
Payer: COMMERCIAL

## 2018-12-06 ENCOUNTER — APPOINTMENT (OUTPATIENT)
Dept: GENERAL RADIOLOGY | Age: 43
End: 2018-12-06
Attending: EMERGENCY MEDICINE
Payer: COMMERCIAL

## 2018-12-06 VITALS
SYSTOLIC BLOOD PRESSURE: 105 MMHG | DIASTOLIC BLOOD PRESSURE: 57 MMHG | OXYGEN SATURATION: 99 % | HEART RATE: 64 BPM | HEIGHT: 63 IN | BODY MASS INDEX: 30.12 KG/M2 | WEIGHT: 170 LBS | RESPIRATION RATE: 18 BRPM | TEMPERATURE: 98.2 F

## 2018-12-06 DIAGNOSIS — F41.1 ANXIETY REACTION: ICD-10-CM

## 2018-12-06 DIAGNOSIS — G43.709 CHRONIC MIGRAINE WITHOUT AURA WITHOUT STATUS MIGRAINOSUS, NOT INTRACTABLE: Primary | ICD-10-CM

## 2018-12-06 LAB
ALBUMIN SERPL-MCNC: 3.9 G/DL (ref 3.4–5)
ALBUMIN/GLOB SERPL: 1.2 {RATIO} (ref 0.8–1.7)
ALP SERPL-CCNC: 93 U/L (ref 45–117)
ALT SERPL-CCNC: 18 U/L (ref 13–56)
ANION GAP SERPL CALC-SCNC: 5 MMOL/L (ref 3–18)
AST SERPL-CCNC: 13 U/L (ref 15–37)
BASOPHILS # BLD: 0.1 K/UL (ref 0–0.1)
BASOPHILS NFR BLD: 1 % (ref 0–2)
BILIRUB SERPL-MCNC: 0.2 MG/DL (ref 0.2–1)
BUN SERPL-MCNC: 8 MG/DL (ref 7–18)
BUN/CREAT SERPL: 9
CALCIUM SERPL-MCNC: 8.1 MG/DL (ref 8.5–10.1)
CHLORIDE SERPL-SCNC: 106 MMOL/L (ref 100–108)
CK MB CFR SERPL CALC: NORMAL % (ref 0–4)
CK MB SERPL-MCNC: <1 NG/ML (ref 5–25)
CK SERPL-CCNC: 77 U/L (ref 26–192)
CO2 SERPL-SCNC: 28 MMOL/L (ref 21–32)
CREAT SERPL-MCNC: 0.93 MG/DL (ref 0.6–1.3)
DIFFERENTIAL METHOD BLD: ABNORMAL
EOSINOPHIL # BLD: 0.4 K/UL (ref 0–0.4)
EOSINOPHIL NFR BLD: 4 % (ref 0–5)
ERYTHROCYTE [DISTWIDTH] IN BLOOD BY AUTOMATED COUNT: 13.7 % (ref 11.6–14.5)
GLOBULIN SER CALC-MCNC: 3.2 G/DL (ref 2–4)
GLUCOSE SERPL-MCNC: 81 MG/DL (ref 74–99)
HCG UR QL: NEGATIVE
HCT VFR BLD AUTO: 36.7 % (ref 35–45)
HGB BLD-MCNC: 11.7 G/DL (ref 12–16)
LYMPHOCYTES # BLD: 2.9 K/UL (ref 0.9–3.6)
LYMPHOCYTES NFR BLD: 32 % (ref 21–52)
MCH RBC QN AUTO: 28.7 PG (ref 24–34)
MCHC RBC AUTO-ENTMCNC: 31.9 G/DL (ref 31–37)
MCV RBC AUTO: 90 FL (ref 74–97)
MONOCYTES # BLD: 0.5 K/UL (ref 0.05–1.2)
MONOCYTES NFR BLD: 5 % (ref 3–10)
NEUTS SEG # BLD: 5.4 K/UL (ref 1.8–8)
NEUTS SEG NFR BLD: 58 % (ref 40–73)
PLATELET # BLD AUTO: 294 K/UL (ref 135–420)
PMV BLD AUTO: 11.2 FL (ref 9.2–11.8)
POTASSIUM SERPL-SCNC: 3.2 MMOL/L (ref 3.5–5.5)
PROT SERPL-MCNC: 7.1 G/DL (ref 6.4–8.2)
RBC # BLD AUTO: 4.08 M/UL (ref 4.2–5.3)
SODIUM SERPL-SCNC: 139 MMOL/L (ref 136–145)
TROPONIN I SERPL-MCNC: <0.02 NG/ML (ref 0–0.04)
WBC # BLD AUTO: 9.3 K/UL (ref 4.6–13.2)

## 2018-12-06 PROCEDURE — 80053 COMPREHEN METABOLIC PANEL: CPT

## 2018-12-06 PROCEDURE — 74011250637 HC RX REV CODE- 250/637: Performed by: EMERGENCY MEDICINE

## 2018-12-06 PROCEDURE — 96374 THER/PROPH/DIAG INJ IV PUSH: CPT

## 2018-12-06 PROCEDURE — 74011250636 HC RX REV CODE- 250/636: Performed by: EMERGENCY MEDICINE

## 2018-12-06 PROCEDURE — 85025 COMPLETE CBC W/AUTO DIFF WBC: CPT

## 2018-12-06 PROCEDURE — 82553 CREATINE MB FRACTION: CPT

## 2018-12-06 PROCEDURE — 93005 ELECTROCARDIOGRAM TRACING: CPT

## 2018-12-06 PROCEDURE — 99285 EMERGENCY DEPT VISIT HI MDM: CPT

## 2018-12-06 PROCEDURE — 96375 TX/PRO/DX INJ NEW DRUG ADDON: CPT

## 2018-12-06 PROCEDURE — 71045 X-RAY EXAM CHEST 1 VIEW: CPT

## 2018-12-06 PROCEDURE — 81025 URINE PREGNANCY TEST: CPT

## 2018-12-06 PROCEDURE — 96361 HYDRATE IV INFUSION ADD-ON: CPT

## 2018-12-06 RX ORDER — HYDROCODONE BITARTRATE AND ACETAMINOPHEN 5; 325 MG/1; MG/1
1 TABLET ORAL ONCE
Status: COMPLETED | OUTPATIENT
Start: 2018-12-06 | End: 2018-12-06

## 2018-12-06 RX ORDER — KETOROLAC TROMETHAMINE 30 MG/ML
30 INJECTION, SOLUTION INTRAMUSCULAR; INTRAVENOUS
Status: COMPLETED | OUTPATIENT
Start: 2018-12-06 | End: 2018-12-06

## 2018-12-06 RX ORDER — PROCHLORPERAZINE EDISYLATE 5 MG/ML
10 INJECTION INTRAMUSCULAR; INTRAVENOUS ONCE
Status: COMPLETED | OUTPATIENT
Start: 2018-12-06 | End: 2018-12-06

## 2018-12-06 RX ORDER — DIPHENHYDRAMINE HYDROCHLORIDE 50 MG/ML
25 INJECTION, SOLUTION INTRAMUSCULAR; INTRAVENOUS
Status: COMPLETED | OUTPATIENT
Start: 2018-12-06 | End: 2018-12-06

## 2018-12-06 RX ADMIN — PROCHLORPERAZINE EDISYLATE 10 MG: 5 INJECTION INTRAMUSCULAR; INTRAVENOUS at 21:08

## 2018-12-06 RX ADMIN — KETOROLAC TROMETHAMINE 30 MG: 30 INJECTION, SOLUTION INTRAMUSCULAR at 20:21

## 2018-12-06 RX ADMIN — SODIUM CHLORIDE 1000 ML: 900 INJECTION, SOLUTION INTRAVENOUS at 20:20

## 2018-12-06 RX ADMIN — HYDROCODONE BITARTRATE AND ACETAMINOPHEN 1 TABLET: 5; 325 TABLET ORAL at 21:09

## 2018-12-06 RX ADMIN — DIPHENHYDRAMINE HYDROCHLORIDE 25 MG: 50 INJECTION, SOLUTION INTRAMUSCULAR; INTRAVENOUS at 21:08

## 2018-12-06 NOTE — LETTER
NOTIFICATION RETURN TO WORK / SCHOOL 
 
12/6/2018 9:18 PM 
 
Ms. Chaoy Pugh [address] [place], [place] [id number] To Whom It May Concern: 
 
Chayo Florez is currently under the care of [facility] EMERGENCY DEPT. She will return to work/school on: 12/8/18 If there are questions or concerns please have the patient contact our office. Sincerely, 
 
 
Dr. Lisa Jaramillo

## 2018-12-07 NOTE — DISCHARGE INSTRUCTIONS
Learning About Anxiety Disorders  What are anxiety disorders? Anxiety disorders are a type of medical problem. They cause severe anxiety. When you feel anxious, you feel that something bad is about to happen. This feeling interferes with your life. These disorders include:  · Generalized anxiety disorder. You feel worried and stressed about many everyday events and activities. This goes on for several months and disrupts your life on most days. · Panic disorder. You have repeated panic attacks. A panic attack is a sudden, intense fear or anxiety. It may make you feel short of breath. Your heart may pound. · Social anxiety disorder. You feel very anxious about what you will say or do in front of people. For example, you may be scared to talk or eat in public. This problem affects your daily life. · Phobias. You are very scared of a specific object, situation, or activity. For example, you may fear spiders, high places, or small spaces. What are the symptoms? Generalized anxiety disorder  Symptoms may include:  · Feeling worried and stressed about many things almost every day. · Feeling tired or irritable. You may have a hard time concentrating. · Having headaches or muscle aches. · Having a hard time getting to sleep or staying asleep. Panic disorder  You may have repeated panic attacks when there is no reason for feeling afraid. You may change your daily activities because you worry that you will have another attack. Symptoms may include:  · Intense fear, terror, or anxiety. · Trouble breathing or very fast breathing. · Chest pain or tightness. · A heartbeat that races or is not regular. Social anxiety disorder  Symptoms may include:  · Fear about a social situation, such as eating in front of others or speaking in public. You may worry a lot. Or you may be afraid that something bad will happen. · Anxiety that can cause you to blush, sweat, and feel shaky.   · A heartbeat that is faster than normal.  · A hard time focusing. Phobias  Symptoms may include:  · More fear than most people of being around an object, being in a situation, or doing an activity. You might also be stressed about the chance of being around the thing you fear. · Worry about losing control, panicking, fainting, or having physical symptoms like a faster heartbeat when you are around the situation or object. How are these disorders treated? Anxiety disorders can be treated with medicines or counseling. A combination of both may be used. Medicines may include:  · Antidepressants. These may help your symptoms by keeping chemicals in your brain in balance. · Benzodiazepines. These may give you short-term relief of your symptoms. Some people use cognitive-behavioral therapy. A therapist helps you learn to change stressful or bad thoughts into helpful thoughts. Lead a healthy lifestyle  A healthy lifestyle may help you feel better. · Get at least 30 minutes of exercise on most days of the week. Walking is a good choice. · Eat a healthy diet. Include fruits, vegetables, lean proteins, and whole grains in your diet each day. · Try to go to bed at the same time every night. Try for 8 hours of sleep a night. · Find ways to manage stress. Try relaxation exercises. · Avoid alcohol and illegal drugs. Follow-up care is a key part of your treatment and safety. Be sure to make and go to all appointments, and call your doctor if you are having problems. It's also a good idea to know your test results and keep a list of the medicines you take. Where can you learn more? Go to http://aury-cruz.info/. Enter O110 in the search box to learn more about \"Learning About Anxiety Disorders. \"  Current as of: December 7, 2017  Content Version: 11.8  © 8325-7767 Healthwise, Incorporated. Care instructions adapted under license by [employer] (which disclaims liability or warranty for this information).  If you have questions about a medical condition or this instruction, always ask your healthcare professional. [address] any warranty or liability for your use of this information. Migraine Headache: Care Instructions  Your Care Instructions  Migraines are painful, throbbing headaches that often start on one side of the head. They may cause nausea and vomiting and make you sensitive to light, sound, or smell. Without treatment, migraines can last from 4 hours to a few days. Medicines can help prevent migraines or stop them after they have started. Your doctor can help you find which ones work best for you. Follow-up care is a key part of your treatment and safety. Be sure to make and go to all appointments, and call your doctor if you are having problems. It's also a good idea to know your test results and keep a list of the medicines you take. How can you care for yourself at home? · Do not drive if you have taken a prescription pain medicine. · Rest in a quiet, dark room until your headache is gone. Close your eyes, and try to relax or go to sleep. Don't watch TV or read. · Put a cold, moist cloth or cold pack on the painful area for 10 to 20 minutes at a time. Put a thin cloth between the cold pack and your skin. · Use a warm, moist towel or a heating pad set on low to relax tight shoulder and neck muscles. · Have someone gently massage your neck and shoulders. · Take your medicines exactly as prescribed. Call your doctor if you think you are having a problem with your medicine. You will get more details on the specific medicines your doctor prescribes. · Be careful not to take pain medicine more often than the instructions allow. You could get worse or more frequent headaches when the medicine wears off. To prevent migraines  · Keep a headache diary so you can figure out what triggers your headaches. Avoiding triggers may help you prevent headaches.  Record when each headache began, how long it lasted, and what the pain was like. (Was it throbbing, aching, stabbing, or dull?) Write down any other symptoms you had with the headache, such as nausea, flashing lights or dark spots, or sensitivity to bright light or loud noise. Note if the headache occurred near your period. List anything that might have triggered the headache. Triggers may include certain foods (chocolate, cheese, wine) or odors, smoke, bright light, stress, or lack of sleep. · If your doctor has prescribed medicine for your migraines, take it as directed. You may have medicine that you take only when you get a migraine and medicine that you take all the time to help prevent migraines. ? If your doctor has prescribed medicine for when you get a headache, take it at the first sign of a migraine, unless your doctor has given you other instructions. ? If your doctor has prescribed medicine to prevent migraines, take it exactly as prescribed. Call your doctor if you think you are having a problem with your medicine. · Find healthy ways to deal with stress. Migraines are most common during or right after stressful times. Take time to relax before and after you do something that has caused a migraine in the past.  · Try to keep your muscles relaxed by keeping good posture. Check your jaw, face, neck, and shoulder muscles for tension. Try to relax them. When you sit at a desk, change positions often. And make sure to stretch for 30 seconds each hour. · Get plenty of sleep and exercise. · Eat meals on a regular schedule. Avoid foods and drinks that often trigger migraines. These include chocolate, alcohol (especially red wine and port), aspartame, monosodium glutamate (MSG), and some additives found in foods (such as hot dogs, deal, cold cuts, aged cheeses, and pickled foods). · Limit caffeine. Don't drink too much coffee, tea, or soda. But don't quit caffeine suddenly. That can also give you migraines.   · Do not smoke or allow others to smoke around you. If you need help quitting, talk to your doctor about stop-smoking programs and medicines. These can increase your chances of quitting for good. · If you are taking birth control pills or hormone therapy, talk to your doctor about whether they are triggering your migraines. When should you call for help? Call 911 anytime you think you may need emergency care. For example, call if:    · You have signs of a stroke. These may include:  ? Sudden numbness, paralysis, or weakness in your face, arm, or leg, especially on only one side of your body. ? Sudden vision changes. ? Sudden trouble speaking. ? Sudden confusion or trouble understanding simple statements. ? Sudden problems with walking or balance. ? A sudden, severe headache that is different from past headaches.    Call your doctor now or seek immediate medical care if:    · You have new or worse nausea and vomiting.     · You have a new or higher fever.     · Your headache gets much worse.    Watch closely for changes in your health, and be sure to contact your doctor if:    · You are not getting better after 2 days (48 hours). Where can you learn more? Go to http://aury-cruz.info/. Enter D940 in the search box to learn more about \"Migraine Headache: Care Instructions. \"  Current as of: June 4, 2018  Content Version: 11.8  © 7325-8798 Healthwise, Incorporated. Care instructions adapted under license by [employer] (which disclaims liability or warranty for this information). If you have questions about a medical condition or this instruction, always ask your healthcare professional. [place] [address] any warranty or liability for your use of this information.

## 2018-12-07 NOTE — ED PROVIDER NOTES
EMERGENCY DEPARTMENT HISTORY AND PHYSICAL EXAM 
 
Date: 12/6/2018 Patient Name: Germán Barillas History of Presenting Illness Chief Complaint Patient presents with  Migraine  Chest Pain  Dizziness History Provided By: Patient Chief Complaint: migraine Duration: 3 Days Timing:  Constant Location: head Quality: Aching Severity: Moderate Modifying Factors: hx of migraines Associated Symptoms: photophobia, nausea, and chest discomfort Additional History (Context):  
Germán Barillas is a 37 y.o. female with PMHX of migraines who presents to the emergency department C/O migraine onset x3 days. Associated sxs include photophobia, nausea, and chest discomfort. Pt reports she has been having migraines for many years. This current episode has lasted for x3 days with no relief from Firocet and Topamax. Notes she typically has photosensitivity and nausea w/o vomiting during her episodes. The pain is non radiating. Pt is being followed by Dr. Mindy Pierre MD (neurology). Pt is also complaining of some chest discomfort. Pt is being followed by Dr. Olivia Gaming MD (primary care) Denies hx of CAD. Pt denies trauma, fever, congestion, neck pain, diaphoresis, SOB, and any other sxs or complaints. PCP: Cristiana Mitchell MD 
 
Current Facility-Administered Medications Medication Dose Route Frequency Provider Last Rate Last Dose  sodium chloride 0.9 % bolus infusion 1,000 mL  1,000 mL IntraVENous Ga Ashby,  1,000 mL/hr at 12/06/18 2020 1,000 mL at 12/06/18 2020 Current Outpatient Medications Medication Sig Dispense Refill  ibuprofen (MOTRIN) 600 mg tablet Take 1 Tab by mouth every six (6) hours as needed for Pain. 20 Tab 0  
 HYDROcodone-acetaminophen (NORCO) 5-325 mg per tablet Take 1 Tab by mouth every four (4) hours as needed for Pain. Max Daily Amount: 6 Tabs.  8 Tab 0  
 diphenhydrAMINE (BENADRYL) 25 mg capsule Take 1-2 tabs every 6 hours with Compazine for headaches as needed. 24 Cap 0  
 aspirin 81 mg chewable tablet Take 1 Tab by mouth daily. 30 Tab 0  
 atorvastatin (LIPITOR) 20 mg tablet Take 1 Tab by mouth daily. 30 Tab 0  clonazePAM (KLONOPIN) 1 mg tablet Take 1 mg by mouth two (2) times a day.  fluticasone (FLONASE) 50 mcg/actuation nasal spray 1 Bath by Both Nostrils route daily. 1 spray in each nostril. 1 Bottle 0  
 sertraline (ZOLOFT) 100 mg tablet Take 200 mg by mouth daily.  levothyroxine (SYNTHROID) 150 mcg tablet Take  by mouth Daily (before breakfast).  esomeprazole (NEXIUM) 40 mg capsule Take  by mouth daily.  ziprasidone (GEODON) 40 mg capsule Take 120 mg by mouth nightly. Indications: BIPOLAR DISORDER IN REMISSION    
 zolpidem (AMBIEN) 10 mg tablet Take  by mouth nightly as needed. Past History Past Medical History: 
Past Medical History:  
Diagnosis Date  Bipolar affective ([facility]  Chronic obstructive pulmonary disease ([facility]  Depression  High cholesterol  Hypothyroidism  IBS (irritable bowel syndrome)  Migraine  Other ill-defined conditions(799.89) frequent UTI's  UTI (urinary tract infection) Past Surgical History: 
Past Surgical History:  
Procedure Laterality Date  HX CHOLECYSTECTOMY  HX GYN    
 tubal ligation  HX HEENT    
 tonsillectomy Family History: 
History reviewed. No pertinent family history. Social History: 
Social History Tobacco Use  Smoking status: Current Every Day Smoker Packs/day: 0.50  Smokeless tobacco: Never Used Substance Use Topics  Alcohol use: Yes Comment: rarely  Drug use: No  
 
 
Allergies: Allergies Allergen Reactions  Sulfa (Sulfonamide Antibiotics) Hives  Imitrex [Sumatriptan Succinate] Other (comments) \"makes headaches worse\"  Topamax [Topiramate] Other (comments) \"the hospital made me stop taking it, I think it caused me to have a stroke\" Review of Systems Review of Systems Constitutional: Negative for chills and fever. HENT: Negative for congestion, rhinorrhea, sore throat and trouble swallowing. Eyes: Negative for visual disturbance. Respiratory: Negative for cough, shortness of breath and wheezing. Cardiovascular: Negative for chest pain. Gastrointestinal: Negative for abdominal pain, nausea and vomiting. Endocrine: Negative for polyuria. Genitourinary: Negative for difficulty urinating and dysuria. Musculoskeletal: Negative for arthralgias and neck stiffness. Skin: Negative for pallor and rash. Neurological: Negative for dizziness, weakness, numbness and headaches. Hematological: Does not bruise/bleed easily. Psychiatric/Behavioral: Negative for confusion and dysphoric mood. All other systems reviewed and are negative. Physical Exam  
 
Vitals:  
 12/06/18 1738 12/06/18 1915 BP: 132/61 Pulse: 72 66 Resp: 16 13 Temp: 98.2 °F (36.8 °C) SpO2: 99% 100% Weight: 77.1 kg (170 lb) Height: 5' 3\" (1.6 m) Physical Exam  
Constitutional: She is oriented to person, place, and time. She appears well-developed and well-nourished. No distress. Pt is in a dark room HENT:  
Head: Normocephalic and atraumatic. Right Ear: External ear normal.  
Left Ear: External ear normal.  
Nose: Nose normal.  
Mouth/Throat: Oropharynx is clear and moist.  
Eyes: Conjunctivae and EOM are normal. Pupils are equal, round, and reactive to light. No scleral icterus. Neck: Normal range of motion. Neck supple. Cardiovascular: Normal rate, regular rhythm, normal heart sounds and intact distal pulses. Pulmonary/Chest: Effort normal and breath sounds normal. No respiratory distress. She has no wheezes. Lungs are clear Abdominal: Soft. Bowel sounds are normal. She exhibits no distension. There is no tenderness. Musculoskeletal: Normal range of motion. She exhibits no edema. FROM head and neck with no neck tenderness Lymphadenopathy:  
  She has no cervical adenopathy. Neurological: She is alert and oriented to person, place, and time. She has normal reflexes. No cranial nerve deficit. Strength 5/5 of the UE and LE Sensations intact No slurred speech Skin: Skin is warm and dry. She is not diaphoretic. Nursing note and vitals reviewed. Diagnostic Study Results Labs - Recent Results (from the past 12 hour(s)) EKG, 12 LEAD, INITIAL Collection Time: 12/06/18  6:16 PM  
Result Value Ref Range Ventricular Rate 62 BPM  
 Atrial Rate 62 BPM  
 P-R Interval 146 ms  
 QRS Duration 92 ms Q-T Interval 444 ms QTC Calculation (Bezet) 450 ms Calculated P Axis 34 degrees Calculated R Axis 60 degrees Calculated T Axis 43 degrees Diagnosis Normal sinus rhythm Normal ECG When compared with ECG of 20-SEP-2018 19:51, No significant change was found CBC WITH AUTOMATED DIFF Collection Time: 12/06/18  6:30 PM  
Result Value Ref Range WBC 9.3 4.6 - 13.2 K/uL  
 RBC 4.08 (L) 4.20 - 5.30 M/uL  
 HGB 11.7 (L) 12.0 - 16.0 g/dL HCT 36.7 35.0 - 45.0 % MCV 90.0 74.0 - 97.0 FL  
 MCH 28.7 24.0 - 34.0 PG  
 MCHC 31.9 31.0 - 37.0 g/dL  
 RDW 13.7 11.6 - 14.5 % PLATELET 310 795 - 037 K/uL MPV 11.2 9.2 - 11.8 FL  
 NEUTROPHILS 58 40 - 73 % LYMPHOCYTES 32 21 - 52 % MONOCYTES 5 3 - 10 % EOSINOPHILS 4 0 - 5 % BASOPHILS 1 0 - 2 %  
 ABS. NEUTROPHILS 5.4 1.8 - 8.0 K/UL  
 ABS. LYMPHOCYTES 2.9 0.9 - 3.6 K/UL  
 ABS. MONOCYTES 0.5 0.05 - 1.2 K/UL  
 ABS. EOSINOPHILS 0.4 0.0 - 0.4 K/UL  
 ABS. BASOPHILS 0.1 0.0 - 0.1 K/UL  
 DF AUTOMATED METABOLIC PANEL, COMPREHENSIVE Collection Time: 12/06/18  6:30 PM  
Result Value Ref Range Sodium 139 136 - 145 mmol/L Potassium 3.2 (L) 3.5 - 5.5 mmol/L Chloride 106 100 - 108 mmol/L  
 CO2 28 21 - 32 mmol/L Anion gap 5 3.0 - 18 mmol/L Glucose 81 74 - 99 mg/dL BUN 8 7.0 - 18 MG/DL Creatinine 0.93 0.6 - 1.3 MG/DL  
 BUN/Creatinine ratio 9    
 GFR est AA >60 >60 ml/min/1.73m2 GFR est non-AA >60 >60 ml/min/1.73m2 Calcium 8.1 (L) 8.5 - 10.1 MG/DL Bilirubin, total 0.2 0.2 - 1.0 MG/DL  
 ALT (SGPT) 18 13 - 56 U/L  
 AST (SGOT) 13 (L) 15 - 37 U/L Alk. phosphatase 93 45 - 117 U/L Protein, total 7.1 6.4 - 8.2 g/dL Albumin 3.9 3.4 - 5.0 g/dL Globulin 3.2 2.0 - 4.0 g/dL A-G Ratio 1.2 0.8 - 1.7 CARDIAC PANEL,(CK, CKMB & TROPONIN) Collection Time: 12/06/18  6:30 PM  
Result Value Ref Range CK 77 26 - 192 U/L  
 CK - MB <1.0 <3.6 ng/ml CK-MB Index  0.0 - 4.0 % CALCULATION NOT PERFORMED WHEN RESULT IS BELOW LINEAR LIMIT Troponin-I, Qt. <0.02 0.0 - 0.045 NG/ML  
HCG URINE, QL. - POC Collection Time: 12/06/18  6:34 PM  
Result Value Ref Range Pregnancy test,urine (POC) NEGATIVE  NEG Radiologic Studies -  
XR CHEST PORT    (Results Pending) CT Results  (Last 48 hours) None CXR Results  (Last 48 hours) None No acute process per Dr Randall Melgoza prelim read Medications given in the ED- Medications  
sodium chloride 0.9 % bolus infusion 1,000 mL (1,000 mL IntraVENous New Bag 12/6/18 2020)  
ketorolac (TORADOL) injection 30 mg (30 mg IntraVENous Given 12/6/18 2021) HYDROcodone-acetaminophen (NORCO) 5-325 mg per tablet 1 Tab (1 Tab Oral Given 12/6/18 2109) diphenhydrAMINE (BENADRYL) injection 25 mg (25 mg IntraVENous Given 12/6/18 2108) prochlorperazine (COMPAZINE) injection 10 mg (10 mg IntraVENous Given 12/6/18 2108) Medical Decision Making I am the first provider for this patient. I reviewed the vital signs, available nursing notes, past medical history, past surgical history, family history and social history. Vital Signs-Reviewed the patient's vital signs. Pulse Oximetry Analysis - 100% on RA Cardiac Monitor: 
Rate: 66 bpm 
Rhythm: NSR 
 
EKG interpretation: (Preliminary) 18:18 PM  
NSR; Rate 62; Normal QRS; Normal QTC; No ST Elevation; No ST Depression EKG read by Alma Turner DO at 9:14 PM  
 
Records Reviewed: Nursing Notes and Old Medical Records Provider Notes (Medical Decision Making): DDx: Migraine head, cluster or tension HA. Likely anxiety. Will check EKG, labs, give IV fluids, pain medication, and nausea medicaiotn. Plan to d/c pt if feeling better. Sxs are consistent with her migraine headache. R/o cardiac etiology. Pt wants note for off work tomorrow. Procedures: 
Procedures ED Course:  
Initial assessment performed. The patients presenting problems have been discussed, and they are in agreement with the care plan formulated and outlined with them. I have encouraged them to ask questions as they arise throughout their visit. Diagnosis and Disposition DISCHARGE NOTE: 
 
Trenton Campos  results have been reviewed with her. She has been counseled regarding her diagnosis, treatment, and plan. She verbally conveys understanding and agreement of the signs, symptoms, diagnosis, treatment and prognosis and additionally agrees to follow up as discussed. She also agrees with the care-plan and conveys that all of her questions have been answered. I have also provided discharge instructions for her that include: educational information regarding their diagnosis and treatment, and list of reasons why they would want to return to the ED prior to their follow-up appointment, should her condition change. She has been provided with education for proper emergency department utilization. CLINICAL IMPRESSION: 
 
1. Chronic migraine without aura without status migrainosus, not intractable 2. Anxiety reaction PLAN: 
1. D/C Home 2. Current Discharge Medication List  
  
 
3. Follow-up Information Follow up With Specialties Details Why Contact Deepti Hernandez MD Neurology Schedule an appointment as soon as possible for a visit in 3 days  [address] [place] [address] 
[phone] Earl Blake MD Internal Medicine Schedule an appointment as soon as possible for a visit  [address] [place] [facility] 
[phone] [facility] EMERGENCY DEPT Emergency Medicine  As needed, If symptoms worsen [address] 
[facility] [address] 
[phone]  
  
 
_______________________________ Attestations: This note is prepared by Love Workman, acting as Scribe for Kiley Berg DO. Kiley Berg DO:  The scribe's documentation has been prepared under my direction and personally reviewed by me in its entirety. I confirm that the note above accurately reflects all work, treatment, procedures, and medical decision making performed by me. 
_______________________________

## 2018-12-31 LAB
ATRIAL RATE: 62 BPM
CALCULATED P AXIS, ECG09: 34 DEGREES
CALCULATED R AXIS, ECG10: 60 DEGREES
CALCULATED T AXIS, ECG11: 43 DEGREES
DIAGNOSIS, 93000: NORMAL
P-R INTERVAL, ECG05: 146 MS
Q-T INTERVAL, ECG07: 444 MS
QRS DURATION, ECG06: 92 MS
QTC CALCULATION (BEZET), ECG08: 450 MS
VENTRICULAR RATE, ECG03: 62 BPM